# Patient Record
Sex: FEMALE | Race: ASIAN | Employment: UNEMPLOYED | URBAN - METROPOLITAN AREA
[De-identification: names, ages, dates, MRNs, and addresses within clinical notes are randomized per-mention and may not be internally consistent; named-entity substitution may affect disease eponyms.]

---

## 2023-03-23 ENCOUNTER — OFFICE VISIT (OUTPATIENT)
Dept: INTERNAL MEDICINE CLINIC | Facility: CLINIC | Age: 33
End: 2023-03-23
Payer: COMMERCIAL

## 2023-03-23 VITALS
BODY MASS INDEX: 22.75 KG/M2 | WEIGHT: 138.19 LBS | HEIGHT: 65.5 IN | OXYGEN SATURATION: 100 % | SYSTOLIC BLOOD PRESSURE: 100 MMHG | DIASTOLIC BLOOD PRESSURE: 60 MMHG | HEART RATE: 78 BPM | RESPIRATION RATE: 16 BRPM | TEMPERATURE: 98 F

## 2023-03-23 DIAGNOSIS — Z00.00 ROUTINE PHYSICAL EXAMINATION: Primary | ICD-10-CM

## 2023-03-23 PROCEDURE — 3074F SYST BP LT 130 MM HG: CPT | Performed by: INTERNAL MEDICINE

## 2023-03-23 PROCEDURE — 3008F BODY MASS INDEX DOCD: CPT | Performed by: INTERNAL MEDICINE

## 2023-03-23 PROCEDURE — 99385 PREV VISIT NEW AGE 18-39: CPT | Performed by: INTERNAL MEDICINE

## 2023-03-23 PROCEDURE — 3078F DIAST BP <80 MM HG: CPT | Performed by: INTERNAL MEDICINE

## 2023-03-23 NOTE — PATIENT INSTRUCTIONS
Continue to exercise at least 150 minutes a week and Eat a plant based diet      Please take 2000 IU of vitamin D daily    Please have blood tests done    See me yearly

## 2023-04-08 ENCOUNTER — LAB ENCOUNTER (OUTPATIENT)
Dept: LAB | Age: 33
End: 2023-04-08
Attending: INTERNAL MEDICINE
Payer: COMMERCIAL

## 2023-04-08 DIAGNOSIS — Z00.00 ROUTINE PHYSICAL EXAMINATION: ICD-10-CM

## 2023-04-08 LAB
ALT SERPL-CCNC: 19 U/L
ANION GAP SERPL CALC-SCNC: 3 MMOL/L (ref 0–18)
AST SERPL-CCNC: 16 U/L (ref 15–37)
BASOPHILS # BLD AUTO: 0.02 X10(3) UL (ref 0–0.2)
BASOPHILS NFR BLD AUTO: 0.3 %
BUN BLD-MCNC: 10 MG/DL (ref 7–18)
CALCIUM BLD-MCNC: 9.4 MG/DL (ref 8.5–10.1)
CHLORIDE SERPL-SCNC: 109 MMOL/L (ref 98–112)
CHOLEST SERPL-MCNC: 159 MG/DL (ref ?–200)
CO2 SERPL-SCNC: 25 MMOL/L (ref 21–32)
CREAT BLD-MCNC: 0.68 MG/DL
EOSINOPHIL # BLD AUTO: 0.11 X10(3) UL (ref 0–0.7)
EOSINOPHIL NFR BLD AUTO: 1.6 %
ERYTHROCYTE [DISTWIDTH] IN BLOOD BY AUTOMATED COUNT: 14.4 %
FASTING PATIENT LIPID ANSWER: YES
FASTING STATUS PATIENT QL REPORTED: YES
GFR SERPLBLD BASED ON 1.73 SQ M-ARVRAT: 116 ML/MIN/1.73M2 (ref 60–?)
GLUCOSE BLD-MCNC: 83 MG/DL (ref 70–99)
HCT VFR BLD AUTO: 36.4 %
HDLC SERPL-MCNC: 34 MG/DL (ref 40–59)
HGB BLD-MCNC: 11.1 G/DL
IMM GRANULOCYTES # BLD AUTO: 0.02 X10(3) UL (ref 0–1)
IMM GRANULOCYTES NFR BLD: 0.3 %
LDLC SERPL CALC-MCNC: 107 MG/DL (ref ?–100)
LYMPHOCYTES # BLD AUTO: 2.15 X10(3) UL (ref 1–4)
LYMPHOCYTES NFR BLD AUTO: 32.1 %
MCH RBC QN AUTO: 24 PG (ref 26–34)
MCHC RBC AUTO-ENTMCNC: 30.5 G/DL (ref 31–37)
MCV RBC AUTO: 78.6 FL
MONOCYTES # BLD AUTO: 0.55 X10(3) UL (ref 0.1–1)
MONOCYTES NFR BLD AUTO: 8.2 %
NEUTROPHILS # BLD AUTO: 3.84 X10 (3) UL (ref 1.5–7.7)
NEUTROPHILS # BLD AUTO: 3.84 X10(3) UL (ref 1.5–7.7)
NEUTROPHILS NFR BLD AUTO: 57.5 %
NONHDLC SERPL-MCNC: 125 MG/DL (ref ?–130)
OSMOLALITY SERPL CALC.SUM OF ELEC: 282 MOSM/KG (ref 275–295)
PLATELET # BLD AUTO: 351 10(3)UL (ref 150–450)
POTASSIUM SERPL-SCNC: 4.1 MMOL/L (ref 3.5–5.1)
RBC # BLD AUTO: 4.63 X10(6)UL
SODIUM SERPL-SCNC: 137 MMOL/L (ref 136–145)
T4 FREE SERPL-MCNC: 1.1 NG/DL (ref 0.8–1.7)
TRIGL SERPL-MCNC: 99 MG/DL (ref 30–149)
TSI SER-ACNC: 8.65 MIU/ML (ref 0.36–3.74)
VLDLC SERPL CALC-MCNC: 17 MG/DL (ref 0–30)
WBC # BLD AUTO: 6.7 X10(3) UL (ref 4–11)

## 2023-04-08 PROCEDURE — 80048 BASIC METABOLIC PNL TOTAL CA: CPT | Performed by: INTERNAL MEDICINE

## 2023-04-08 PROCEDURE — 84450 TRANSFERASE (AST) (SGOT): CPT | Performed by: INTERNAL MEDICINE

## 2023-04-08 PROCEDURE — 84460 ALANINE AMINO (ALT) (SGPT): CPT | Performed by: INTERNAL MEDICINE

## 2023-04-08 PROCEDURE — 80061 LIPID PANEL: CPT | Performed by: INTERNAL MEDICINE

## 2023-04-08 PROCEDURE — 84439 ASSAY OF FREE THYROXINE: CPT | Performed by: INTERNAL MEDICINE

## 2023-04-08 PROCEDURE — 84443 ASSAY THYROID STIM HORMONE: CPT | Performed by: INTERNAL MEDICINE

## 2023-04-08 PROCEDURE — 85025 COMPLETE CBC W/AUTO DIFF WBC: CPT | Performed by: INTERNAL MEDICINE

## 2023-04-10 ENCOUNTER — TELEPHONE (OUTPATIENT)
Dept: INTERNAL MEDICINE CLINIC | Facility: CLINIC | Age: 33
End: 2023-04-10

## 2023-04-10 NOTE — TELEPHONE ENCOUNTER
Please have patient schedule a video visit to discuss her test results  32 \[facility] [place] [facility]\"" DO

## 2023-04-10 NOTE — TELEPHONE ENCOUNTER
Patient scheduled.     Future Appointments   Date Time Provider Erum Ritchie   4/11/2023 10:00 AM Cheryl Gamboa DO [facility] [place]

## 2023-04-11 ENCOUNTER — TELEMEDICINE (OUTPATIENT)
Dept: INTERNAL MEDICINE CLINIC | Facility: CLINIC | Age: 33
End: 2023-04-11
Payer: COMMERCIAL

## 2023-04-11 DIAGNOSIS — D50.0 IRON DEFICIENCY ANEMIA DUE TO CHRONIC BLOOD LOSS: ICD-10-CM

## 2023-04-11 DIAGNOSIS — E55.9 VITAMIN D DEFICIENCY: ICD-10-CM

## 2023-04-11 DIAGNOSIS — E03.9 ACQUIRED HYPOTHYROIDISM: Primary | ICD-10-CM

## 2023-04-11 DIAGNOSIS — E53.8 VITAMIN B12 DEFICIENCY: ICD-10-CM

## 2023-04-11 PROCEDURE — 99213 OFFICE O/P EST LOW 20 MIN: CPT | Performed by: INTERNAL MEDICINE

## 2023-04-11 RX ORDER — FERROUS SULFATE 325(65) MG
325 TABLET ORAL
Qty: 90 TABLET | Refills: 0 | Status: SHIPPED | OUTPATIENT
Start: 2023-04-11

## 2023-04-11 RX ORDER — LEVOTHYROXINE SODIUM 0.03 MG/1
25 TABLET ORAL
Qty: 90 TABLET | Refills: 1 | Status: SHIPPED | OUTPATIENT
Start: 2023-04-11

## 2023-04-11 NOTE — PROGRESS NOTES
Virtual Telephone Check-In    This visit is conducted using Telemedicine with live, interactive video and audio. Patient resides in [place]   Patient understands and accepts financial responsibility for any deductible, co-insurance and/or co-pays associated with this service. Telehealth outside of [employer] Verbal Consent   I conducted a telehealth visit with [facility] on 4/11/2023   which was completed using two-way, real-time interactive audio and video  communication. This has been done in good moose to provide continuity of care in the best interest of the provider-patient relationship, due to the COVID -19 public health crisis/national emergency where restrictions of face-to-face office visits are ongoing. Every conscious effort was taken to allow for sufficient and adequate time to complete the visit. The patient was made aware of the limitations of the telehealth visit, including treatment limitations as no physical exam could be performed. The patient was advised to call 911 or to go to the ER in case there was an emergency. The patient was also advised of the potential privacy & security concerns related to the telehealth platform. The patient was made aware of where to find [facility] notice of privacy practices, telehealth consent form and other related consent forms and documents. which are located on the [facility] website. The patient verbally agreed to telehealth consent form, related consents and the risks discussed. Lastly, the patient confirmed that they were in [place]. Included in this visit, time may have been spent reviewing labs, medications, radiology tests and decision making. Appropriate medical decision-making and tests are ordered as detailed in the plan of care above. Coding/billing information is submitted for this visit based on complexity of care and/or time spent for the visit.   Time spent: 6 minutes  HPI: Ubaldo Flores is a 26year old female who is calling to discuss test results. Had hypothyroidism during pregnancy. Has heavy menses as well   ROS:  General: Feels well overall  Skin: Denies any unusual skin lesions  Eyes: Denies blurred vision or double vision  All systems negative, except for above  Physical:  GENERAL: Alert and oriented, well developed, well nourished,in no apparent distress  SKIN: no rashes,no suspicious lesions on face  LUNGS: no audible wheezing  PSYCH: pleasant, appropriate mood and affect    Assessment and Plan  (E03.9) Acquired hypothyroidism  (primary encounter diagnosis)  Plan: levothyroxine 25 MCG Oral Tab, ASSAY, THYROID         STIM HORMONE  Note: will start medication as above. Discussed how to take the medication     (D50.0) Iron deficiency anemia due to chronic blood loss  Plan: Ferrous Sulfate 325 (65 Fe) MG Oral Tab, CBC         WITH DIFFERENTIAL WITH PLATELET  Note: will start iron supplement as above.  Discussed the side effects     (E55.9) Vitamin D deficiency  Plan: VITAMIN D  Note: requesting labs    (E53.8) Vitamin B12 deficiency  Plan: VITAMIN B12  Note: requesting labs    32 [facility]

## 2023-08-03 ENCOUNTER — LAB ENCOUNTER (OUTPATIENT)
Dept: LAB | Age: 33
End: 2023-08-03
Attending: INTERNAL MEDICINE
Payer: COMMERCIAL

## 2023-08-03 ENCOUNTER — OFFICE VISIT (OUTPATIENT)
Dept: INTERNAL MEDICINE CLINIC | Facility: CLINIC | Age: 33
End: 2023-08-03
Payer: COMMERCIAL

## 2023-08-03 VITALS
HEART RATE: 76 BPM | WEIGHT: 146.19 LBS | TEMPERATURE: 98 F | BODY MASS INDEX: 24.36 KG/M2 | OXYGEN SATURATION: 99 % | SYSTOLIC BLOOD PRESSURE: 112 MMHG | HEIGHT: 65 IN | DIASTOLIC BLOOD PRESSURE: 68 MMHG | RESPIRATION RATE: 14 BRPM

## 2023-08-03 DIAGNOSIS — E55.9 VITAMIN D DEFICIENCY: ICD-10-CM

## 2023-08-03 DIAGNOSIS — E03.9 ACQUIRED HYPOTHYROIDISM: ICD-10-CM

## 2023-08-03 DIAGNOSIS — D50.0 IRON DEFICIENCY ANEMIA DUE TO CHRONIC BLOOD LOSS: ICD-10-CM

## 2023-08-03 DIAGNOSIS — M79.7 FIBROMYALGIA: ICD-10-CM

## 2023-08-03 DIAGNOSIS — E53.8 VITAMIN B12 DEFICIENCY: ICD-10-CM

## 2023-08-03 DIAGNOSIS — E03.9 ACQUIRED HYPOTHYROIDISM: Primary | ICD-10-CM

## 2023-08-03 LAB
BASOPHILS # BLD AUTO: 0.03 X10(3) UL (ref 0–0.2)
BASOPHILS NFR BLD AUTO: 0.5 %
CRP SERPL-MCNC: <0.29 MG/DL (ref ?–0.3)
DEPRECATED HBV CORE AB SER IA-ACNC: 20.6 NG/ML
EOSINOPHIL # BLD AUTO: 0.1 X10(3) UL (ref 0–0.7)
EOSINOPHIL NFR BLD AUTO: 1.6 %
ERYTHROCYTE [DISTWIDTH] IN BLOOD BY AUTOMATED COUNT: 13.5 %
ERYTHROCYTE [SEDIMENTATION RATE] IN BLOOD: 39 MM/HR
HCT VFR BLD AUTO: 39.5 %
HGB BLD-MCNC: 12.6 G/DL
IMM GRANULOCYTES # BLD AUTO: 0.01 X10(3) UL (ref 0–1)
IMM GRANULOCYTES NFR BLD: 0.2 %
LYMPHOCYTES # BLD AUTO: 2.05 X10(3) UL (ref 1–4)
LYMPHOCYTES NFR BLD AUTO: 32.1 %
MCH RBC QN AUTO: 26.5 PG (ref 26–34)
MCHC RBC AUTO-ENTMCNC: 31.9 G/DL (ref 31–37)
MCV RBC AUTO: 83 FL
MONOCYTES # BLD AUTO: 0.46 X10(3) UL (ref 0.1–1)
MONOCYTES NFR BLD AUTO: 7.2 %
NEUTROPHILS # BLD AUTO: 3.74 X10 (3) UL (ref 1.5–7.7)
NEUTROPHILS # BLD AUTO: 3.74 X10(3) UL (ref 1.5–7.7)
NEUTROPHILS NFR BLD AUTO: 58.4 %
PLATELET # BLD AUTO: 304 10(3)UL (ref 150–450)
RBC # BLD AUTO: 4.76 X10(6)UL
TSI SER-ACNC: 5.35 MIU/ML (ref 0.36–3.74)
VIT B12 SERPL-MCNC: 178 PG/ML (ref 193–986)
VIT D+METAB SERPL-MCNC: 6.7 NG/ML (ref 30–100)
WBC # BLD AUTO: 6.4 X10(3) UL (ref 4–11)

## 2023-08-03 PROCEDURE — 84443 ASSAY THYROID STIM HORMONE: CPT | Performed by: INTERNAL MEDICINE

## 2023-08-03 PROCEDURE — 86140 C-REACTIVE PROTEIN: CPT | Performed by: INTERNAL MEDICINE

## 2023-08-03 PROCEDURE — 85652 RBC SED RATE AUTOMATED: CPT | Performed by: INTERNAL MEDICINE

## 2023-08-03 PROCEDURE — 3078F DIAST BP <80 MM HG: CPT | Performed by: INTERNAL MEDICINE

## 2023-08-03 PROCEDURE — 82306 VITAMIN D 25 HYDROXY: CPT | Performed by: INTERNAL MEDICINE

## 2023-08-03 PROCEDURE — 82607 VITAMIN B-12: CPT | Performed by: INTERNAL MEDICINE

## 2023-08-03 PROCEDURE — 99213 OFFICE O/P EST LOW 20 MIN: CPT | Performed by: INTERNAL MEDICINE

## 2023-08-03 PROCEDURE — 3074F SYST BP LT 130 MM HG: CPT | Performed by: INTERNAL MEDICINE

## 2023-08-03 PROCEDURE — 82728 ASSAY OF FERRITIN: CPT | Performed by: INTERNAL MEDICINE

## 2023-08-03 PROCEDURE — 3008F BODY MASS INDEX DOCD: CPT | Performed by: INTERNAL MEDICINE

## 2023-08-03 PROCEDURE — 85025 COMPLETE CBC W/AUTO DIFF WBC: CPT | Performed by: INTERNAL MEDICINE

## 2023-08-03 RX ORDER — LEVOTHYROXINE SODIUM 0.03 MG/1
25 TABLET ORAL
Qty: 90 TABLET | Refills: 1 | Status: SHIPPED | OUTPATIENT
Start: 2023-08-03

## 2023-08-03 RX ORDER — FLUOXETINE 10 MG/1
10 TABLET, FILM COATED ORAL DAILY
Qty: 90 TABLET | Refills: 0 | Status: SHIPPED | OUTPATIENT
Start: 2023-08-03

## 2023-08-03 NOTE — PATIENT INSTRUCTIONS
Please have blood test done. Continue the iron supplement and the thyroid medication. Start Prozac in the morning and let me know in 6 weeks how you are doing.   We will call you with the test results

## 2023-08-04 DIAGNOSIS — E53.8 VITAMIN B12 DEFICIENCY: ICD-10-CM

## 2023-08-04 DIAGNOSIS — E55.9 VITAMIN D DEFICIENCY: Primary | ICD-10-CM

## 2023-08-04 RX ORDER — ERGOCALCIFEROL 1.25 MG/1
50000 CAPSULE ORAL WEEKLY
Qty: 12 CAPSULE | Refills: 0 | Status: SHIPPED | OUTPATIENT
Start: 2023-08-04 | End: 2023-10-27

## 2023-08-04 NOTE — PROGRESS NOTES
Dear RN, please let the patient know   1. Vitamin B12 is low. Needs to take 1000 mcg of vitamin B12 daily and recheck labs in 3 months  2. Thyroid is improving. Continue current dose  3. Vitamin D is very low. I will send a prescription strength medication for 12 weeks. Then Please take 2000 IU of vitamin D daily for life. Recheck labs in 3 months  4 there is mild inflammation noted and sometimes can be if she is anemic. If her pain does not improve with prozac then I will do further testing  Iron levels have improved.  Continue iron supplement  Daisy Barakat DO

## 2024-04-04 ENCOUNTER — LAB ENCOUNTER (OUTPATIENT)
Dept: LAB | Age: 34
End: 2024-04-04
Attending: INTERNAL MEDICINE
Payer: COMMERCIAL

## 2024-04-04 ENCOUNTER — OFFICE VISIT (OUTPATIENT)
Dept: INTERNAL MEDICINE CLINIC | Facility: CLINIC | Age: 34
End: 2024-04-04
Payer: COMMERCIAL

## 2024-04-04 VITALS
RESPIRATION RATE: 14 BRPM | BODY MASS INDEX: 25.05 KG/M2 | HEIGHT: 65 IN | DIASTOLIC BLOOD PRESSURE: 68 MMHG | OXYGEN SATURATION: 98 % | HEART RATE: 70 BPM | SYSTOLIC BLOOD PRESSURE: 110 MMHG | TEMPERATURE: 98 F | WEIGHT: 150.38 LBS

## 2024-04-04 DIAGNOSIS — E55.9 VITAMIN D DEFICIENCY: ICD-10-CM

## 2024-04-04 DIAGNOSIS — F41.9 ANXIETY: ICD-10-CM

## 2024-04-04 DIAGNOSIS — D50.0 IRON DEFICIENCY ANEMIA DUE TO CHRONIC BLOOD LOSS: ICD-10-CM

## 2024-04-04 DIAGNOSIS — Z00.00 ROUTINE PHYSICAL EXAMINATION: ICD-10-CM

## 2024-04-04 DIAGNOSIS — E53.8 VITAMIN B12 DEFICIENCY: ICD-10-CM

## 2024-04-04 DIAGNOSIS — L30.9 DERMATITIS: ICD-10-CM

## 2024-04-04 DIAGNOSIS — Z00.00 ROUTINE PHYSICAL EXAMINATION: Primary | ICD-10-CM

## 2024-04-04 DIAGNOSIS — E03.9 ACQUIRED HYPOTHYROIDISM: ICD-10-CM

## 2024-04-04 LAB
ALT SERPL-CCNC: 16 U/L
ANION GAP SERPL CALC-SCNC: 6 MMOL/L (ref 0–18)
AST SERPL-CCNC: 23 U/L (ref ?–34)
BASOPHILS # BLD AUTO: 0.02 X10(3) UL (ref 0–0.2)
BASOPHILS NFR BLD AUTO: 0.3 %
BUN BLD-MCNC: 8 MG/DL (ref 9–23)
BUN/CREAT SERPL: 10.5 (ref 10–20)
CALCIUM BLD-MCNC: 9.2 MG/DL (ref 8.7–10.4)
CHLORIDE SERPL-SCNC: 110 MMOL/L (ref 98–112)
CHOLEST SERPL-MCNC: 167 MG/DL (ref ?–200)
CO2 SERPL-SCNC: 24 MMOL/L (ref 21–32)
CREAT BLD-MCNC: 0.76 MG/DL
DEPRECATED HBV CORE AB SER IA-ACNC: 13.6 NG/ML
DEPRECATED RDW RBC AUTO: 38.6 FL (ref 35.1–46.3)
EGFRCR SERPLBLD CKD-EPI 2021: 105 ML/MIN/1.73M2 (ref 60–?)
EOSINOPHIL # BLD AUTO: 0.08 X10(3) UL (ref 0–0.7)
EOSINOPHIL NFR BLD AUTO: 1.2 %
ERYTHROCYTE [DISTWIDTH] IN BLOOD BY AUTOMATED COUNT: 12.9 % (ref 11–15)
EST. AVERAGE GLUCOSE BLD GHB EST-MCNC: 111 MG/DL (ref 68–126)
FASTING PATIENT LIPID ANSWER: YES
FASTING STATUS PATIENT QL REPORTED: YES
GLUCOSE BLD-MCNC: 94 MG/DL (ref 70–99)
HBA1C MFR BLD: 5.5 % (ref ?–5.7)
HCT VFR BLD AUTO: 37.8 %
HDLC SERPL-MCNC: 31 MG/DL (ref 40–59)
HGB BLD-MCNC: 11.9 G/DL
IMM GRANULOCYTES # BLD AUTO: 0.02 X10(3) UL (ref 0–1)
IMM GRANULOCYTES NFR BLD: 0.3 %
LDLC SERPL CALC-MCNC: 102 MG/DL (ref ?–100)
LYMPHOCYTES # BLD AUTO: 1.89 X10(3) UL (ref 1–4)
LYMPHOCYTES NFR BLD AUTO: 27.8 %
MCH RBC QN AUTO: 25.9 PG (ref 26–34)
MCHC RBC AUTO-ENTMCNC: 31.5 G/DL (ref 31–37)
MCV RBC AUTO: 82.4 FL
MONOCYTES # BLD AUTO: 0.5 X10(3) UL (ref 0.1–1)
MONOCYTES NFR BLD AUTO: 7.3 %
NEUTROPHILS # BLD AUTO: 4.3 X10 (3) UL (ref 1.5–7.7)
NEUTROPHILS # BLD AUTO: 4.3 X10(3) UL (ref 1.5–7.7)
NEUTROPHILS NFR BLD AUTO: 63.1 %
NONHDLC SERPL-MCNC: 136 MG/DL (ref ?–130)
OSMOLALITY SERPL CALC.SUM OF ELEC: 288 MOSM/KG (ref 275–295)
PLATELET # BLD AUTO: 350 10(3)UL (ref 150–450)
POTASSIUM SERPL-SCNC: 4.4 MMOL/L (ref 3.5–5.1)
RBC # BLD AUTO: 4.59 X10(6)UL
SODIUM SERPL-SCNC: 140 MMOL/L (ref 136–145)
T4 FREE SERPL-MCNC: 1.2 NG/DL (ref 0.8–1.7)
TRIGL SERPL-MCNC: 194 MG/DL (ref 30–149)
TSI SER-ACNC: 6.23 MIU/ML (ref 0.55–4.78)
VIT B12 SERPL-MCNC: 297 PG/ML (ref 211–911)
VIT D+METAB SERPL-MCNC: 16.5 NG/ML (ref 30–100)
VLDLC SERPL CALC-MCNC: 33 MG/DL (ref 0–30)
WBC # BLD AUTO: 6.8 X10(3) UL (ref 4–11)

## 2024-04-04 PROCEDURE — 84439 ASSAY OF FREE THYROXINE: CPT

## 2024-04-04 PROCEDURE — 99213 OFFICE O/P EST LOW 20 MIN: CPT | Performed by: INTERNAL MEDICINE

## 2024-04-04 PROCEDURE — 80048 BASIC METABOLIC PNL TOTAL CA: CPT

## 2024-04-04 PROCEDURE — 36415 COLL VENOUS BLD VENIPUNCTURE: CPT

## 2024-04-04 PROCEDURE — 3078F DIAST BP <80 MM HG: CPT | Performed by: INTERNAL MEDICINE

## 2024-04-04 PROCEDURE — 80061 LIPID PANEL: CPT

## 2024-04-04 PROCEDURE — 99395 PREV VISIT EST AGE 18-39: CPT | Performed by: INTERNAL MEDICINE

## 2024-04-04 PROCEDURE — 84460 ALANINE AMINO (ALT) (SGPT): CPT

## 2024-04-04 PROCEDURE — 82607 VITAMIN B-12: CPT

## 2024-04-04 PROCEDURE — 3008F BODY MASS INDEX DOCD: CPT | Performed by: INTERNAL MEDICINE

## 2024-04-04 PROCEDURE — 82306 VITAMIN D 25 HYDROXY: CPT

## 2024-04-04 PROCEDURE — 84443 ASSAY THYROID STIM HORMONE: CPT

## 2024-04-04 PROCEDURE — 84450 TRANSFERASE (AST) (SGOT): CPT

## 2024-04-04 PROCEDURE — 82728 ASSAY OF FERRITIN: CPT

## 2024-04-04 PROCEDURE — 85025 COMPLETE CBC W/AUTO DIFF WBC: CPT

## 2024-04-04 PROCEDURE — 83036 HEMOGLOBIN GLYCOSYLATED A1C: CPT

## 2024-04-04 PROCEDURE — 3074F SYST BP LT 130 MM HG: CPT | Performed by: INTERNAL MEDICINE

## 2024-04-04 RX ORDER — DULOXETIN HYDROCHLORIDE 30 MG/1
30 CAPSULE, DELAYED RELEASE ORAL NIGHTLY
Qty: 90 CAPSULE | Refills: 0 | Status: SHIPPED | OUTPATIENT
Start: 2024-04-04

## 2024-04-04 NOTE — PATIENT INSTRUCTIONS
Continue to exercise at least 150 minutes a week and Eat a plant based diet     Please take 2000 IU of vitamin D daily for life to keep your bones strong    Please see your dentist every 6 months  Continue with regular eye exams    Please have blood work done    Start cymbalta at night time. It may take up to 4 weeks to see an affect. Send me a message in 6 weeks    Start the cream I have provided and take zyrtec

## 2024-04-04 NOTE — PROGRESS NOTES
Patient Office Visit    ASSESSMENT AND PLAN:   1. Routine physical examination  Note: Continue to exercise at least 150 minutes a week and Eat a plant based diet. Please see your dentist every 6 months and continue with regular eye exams  - ALT(SGPT); Future  - AST (SGOT); Future  - Basic Metabolic Panel (8); Future  - Lipid Panel; Future  - Hemoglobin A1C; Future  - CBC With Differential With Platelet; Future  - TSH W Reflex To Free T4; Future  - Vitamin D; Future  - Vitamin B12 [E]; Future    2. Iron deficiency anemia due to chronic blood loss  Note: will repeat labs   - CBC With Differential With Platelet; Future  - Ferritin [E]; Future    3. Acquired hypothyroidism  Note: has stopped the medication. Will repeat labs   - TSH W Reflex To Free T4; Future    4. Vitamin B12 deficiency  - Vitamin B12 [E]; Future    5. Vitamin D deficiency  - Vitamin D; Future    6. Anxiety  Note: did not see any benefit from prozac. Will start cymbalta. Discussed the side effects. May take up to 4 weeks to see an affect   - DULoxetine (CYMBALTA) 30 MG Oral Cap DR Particles; Take 1 capsule (30 mg total) by mouth at bedtime.  Dispense: 90 capsule; Refill: 0    7. Dermatitis  Note: recommended to stop the oil and start treatment below for 2 weeks. Zyrtec samples provided as well   - hydrocortisone 2.5 % External Cream; Apply twice a day for 2 weeks to affected area then stop  Dispense: 20 g; Refill: 0    RTC in 4-6 weeks   Declines pap today      Patient/Caregiver Education: Patient/Caregiver Education: There are no barriers to learning. Medical education done. Outcome: Patient verbalizes understanding. Patient is notified to call with any questions, complications, allergies, or worsening or changing symptoms.  Patient is to call with any side effects or complications from the treatments as a result of today.      Reviewed Past Medical History and   Patient Active Problem List   Diagnosis    Acquired hypothyroidism    Iron deficiency  anemia due to chronic blood loss    Vitamin B12 deficiency    Vitamin D deficiency       Orders Placed This Encounter   Procedures    ALT(SGPT)     Standing Status:   Future     Standing Expiration Date:   4/4/2025    AST (SGOT)     Standing Status:   Future     Standing Expiration Date:   4/4/2025    Basic Metabolic Panel (8)     Standing Status:   Future     Standing Expiration Date:   4/4/2025    Lipid Panel     Standing Status:   Future     Standing Expiration Date:   4/4/2025    Hemoglobin A1C     Standing Status:   Future     Standing Expiration Date:   4/4/2025    CBC With Differential With Platelet     Standing Status:   Future     Standing Expiration Date:   4/4/2025    TSH W Reflex To Free T4     Standing Status:   Future     Standing Expiration Date:   4/4/2025    Vitamin D     Standing Status:   Future     Standing Expiration Date:   4/4/2025     Order Specific Question:   Please pick the scenario that best fits the purpose for ordering this test     Answer:   General Screening/Vit D deficiency (25-Hydroxy)     Order Specific Question:   Release to patient     Answer:   Immediate    Vitamin B12 [E]     Standing Status:   Future     Standing Expiration Date:   4/4/2025     Order Specific Question:   Release to patient     Answer:   Immediate    Ferritin [E]     Standing Status:   Future     Standing Expiration Date:   4/4/2025     Order Specific Question:   Release to patient     Answer:   Immediate     Requested Prescriptions     Signed Prescriptions Disp Refills    DULoxetine (CYMBALTA) 30 MG Oral Cap DR Particles 90 capsule 0     Sig: Take 1 capsule (30 mg total) by mouth at bedtime.    hydrocortisone 2.5 % External Cream 20 g 0     Sig: Apply twice a day for 2 weeks to affected area then stop         Daisy Miller DO  CC:  Chief Complaint   Patient presents with    Medication Follow-Up    Derm Problem     Rash on forehead. 3-4 weeks.          HPI:   Sarai Young is a 33 year old female  who presents for a routine physical and to discuss the following concerns    Hypothyroid/iron deficiency: off medications due to insurance problems so could not get a test  Anxiety: still has a feeling of severe nervousness if she has a family situation. Feels like her whole body is in pain even though she has no particular joint pain. She tried prozac, but it did not help. She would like to try something different  Dermatitis: she tried a new oil on her face (jojoba/rosehip oil) and her entire face broke out. It is itchy and requesting a cream to resolve it     No past medical history on file.    Past Surgical History:   Procedure Laterality Date             Social History:  Social History     Socioeconomic History    Marital status:    Tobacco Use    Smoking status: Never    Smokeless tobacco: Never    Tobacco comments:     No   Vaping Use    Vaping Use: Never used   Substance and Sexual Activity    Alcohol use: Never    Drug use: Never   Other Topics Concern    Caffeine Concern No    Exercise No    Seat Belt Yes    Special Diet No    Stress Concern No    Weight Concern No     Family History:  No family history on file.  Allergies:  No Known Allergies  Current Meds:  Current Outpatient Medications on File Prior to Visit   Medication Sig Dispense Refill    levothyroxine 25 MCG Oral Tab Take 1 tablet (25 mcg total) by mouth before breakfast. (Patient not taking: Reported on 2024) 90 tablet 1    Ferrous Sulfate 325 (65 Fe) MG Oral Tab Take 1 tablet (325 mg total) by mouth daily with breakfast. (Patient not taking: Reported on 2024) 90 tablet 0     No current facility-administered medications on file prior to visit.         REVIEW OF SYSTEMS   Constitutional: no fatigue normal energy no weight changes   HENT: normal sinuses and no mouth issues   Eyes: . normal vision no eye pain   Respiratory: normal respirations no cough   Cardiovascular: no CP, or palpitations   Gastrointestinal: normal  bowels and no abd pains   Genitourinary:  normal urination no hematuria, no frequency   Musculoskeletal: no pains in arms/legs, normal range of motion   Skin: no rashes or skin lesions that are new   Neurological:  no weakness, no numbness, normal gait   Hematological:  no bruises or bleeding   Psychiatric/Behavioral: normal mood no anxiety normal behavior     /68 (BP Location: Left arm, Patient Position: Sitting, Cuff Size: adult)   Pulse 70   Temp 97.8 °F (36.6 °C) (Temporal)   Resp 14   Ht 5' 5\" (1.651 m)   Wt 150 lb 6.4 oz (68.2 kg)   LMP 03/09/2024 (Exact Date)   SpO2 98%   BMI 25.03 kg/m²     PHYSICAL EXAM:   Constitutional: Vital signs reviewed as noted, well developed, in no acute distress.   HENT: NCAT, bilateral ear canal and tympanic membrane appear normal  Eyes: pupils reactive bilaterally  Neck: No thyroidmegaly  Cardiovascular: nl s1 s2 no m/r/g  Pulmonary/Chest: CTA bilaterally with no wheezes  Abdominal: Soft NT normal Bowel sounds  Extremities: no pedal edema   Neurological:  no weakness in UE and LE, reflexes are normal  Skin: small papules noted on the forehead   Psychiatric:normal mood

## 2024-04-05 DIAGNOSIS — E03.9 ACQUIRED HYPOTHYROIDISM: ICD-10-CM

## 2024-04-05 DIAGNOSIS — E55.9 VITAMIN D DEFICIENCY: Primary | ICD-10-CM

## 2024-04-05 DIAGNOSIS — D50.0 IRON DEFICIENCY ANEMIA DUE TO CHRONIC BLOOD LOSS: ICD-10-CM

## 2024-04-05 DIAGNOSIS — E53.8 VITAMIN B12 DEFICIENCY: ICD-10-CM

## 2024-04-05 RX ORDER — ERGOCALCIFEROL 1.25 MG/1
50000 CAPSULE ORAL WEEKLY
Qty: 12 CAPSULE | Refills: 0 | Status: SHIPPED | OUTPATIENT
Start: 2024-04-05 | End: 2024-06-28

## 2024-04-05 RX ORDER — MELATONIN
1000 DAILY
Qty: 90 TABLET | Refills: 0 | Status: SHIPPED | OUTPATIENT
Start: 2024-04-05

## 2024-04-05 RX ORDER — LEVOTHYROXINE SODIUM 0.03 MG/1
25 TABLET ORAL
Qty: 90 TABLET | Refills: 1 | Status: SHIPPED | OUTPATIENT
Start: 2024-04-05

## 2024-04-05 RX ORDER — FERROUS SULFATE 325(65) MG
325 TABLET ORAL
Qty: 90 TABLET | Refills: 1 | Status: SHIPPED | OUTPATIENT
Start: 2024-04-05

## 2024-05-01 ENCOUNTER — PATIENT MESSAGE (OUTPATIENT)
Dept: INTERNAL MEDICINE CLINIC | Facility: CLINIC | Age: 34
End: 2024-05-01

## 2024-05-02 NOTE — TELEPHONE ENCOUNTER
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 They asked me to have a signature in the form from the physician. Kindly please see the attached document and return to me the signed copy. Thankyou so much.

## 2024-07-15 DIAGNOSIS — F41.9 ANXIETY: ICD-10-CM

## 2024-07-15 RX ORDER — DULOXETIN HYDROCHLORIDE 30 MG/1
30 CAPSULE, DELAYED RELEASE ORAL NIGHTLY
Qty: 90 CAPSULE | Refills: 0 | Status: SHIPPED | OUTPATIENT
Start: 2024-07-15

## 2024-07-15 NOTE — TELEPHONE ENCOUNTER
PASS    LOV 4/4/24    6. Anxiety  Note: did not see any benefit from prozac. Will start cymbalta. Discussed the side effects. May take up to 4 weeks to see an affect   - DULoxetine (CYMBALTA) 30 MG Oral Cap DR Particles; Take 1 capsule (30 mg total) by mouth at bedtime.  Dispense: 90 capsule; Refill: 0    F/U 4-6 WEEKS     No future appointments.

## 2024-11-08 ENCOUNTER — TELEPHONE (OUTPATIENT)
Dept: INTERNAL MEDICINE CLINIC | Facility: CLINIC | Age: 34
End: 2024-11-08

## 2024-11-08 ENCOUNTER — PATIENT MESSAGE (OUTPATIENT)
Dept: INTERNAL MEDICINE CLINIC | Facility: CLINIC | Age: 34
End: 2024-11-08

## 2024-11-08 NOTE — TELEPHONE ENCOUNTER
Patient spouse called the office to request an appointment for a checkup as well as to have thyroid levels checked before they leave for [facility] on 11/20 to return on 01/17/25. Please call back and advise.

## 2024-11-19 ENCOUNTER — OFFICE VISIT (OUTPATIENT)
Dept: INTERNAL MEDICINE CLINIC | Facility: CLINIC | Age: 34
End: 2024-11-19
Payer: COMMERCIAL

## 2024-11-19 ENCOUNTER — LAB ENCOUNTER (OUTPATIENT)
Dept: LAB | Age: 34
End: 2024-11-19
Attending: INTERNAL MEDICINE
Payer: COMMERCIAL

## 2024-11-19 VITALS
TEMPERATURE: 98 F | DIASTOLIC BLOOD PRESSURE: 68 MMHG | WEIGHT: 148 LBS | SYSTOLIC BLOOD PRESSURE: 102 MMHG | OXYGEN SATURATION: 98 % | RESPIRATION RATE: 16 BRPM | HEIGHT: 65 IN | BODY MASS INDEX: 24.66 KG/M2 | HEART RATE: 81 BPM

## 2024-11-19 DIAGNOSIS — E53.8 VITAMIN B12 DEFICIENCY: ICD-10-CM

## 2024-11-19 DIAGNOSIS — D50.0 IRON DEFICIENCY ANEMIA DUE TO CHRONIC BLOOD LOSS: Primary | ICD-10-CM

## 2024-11-19 DIAGNOSIS — D50.0 IRON DEFICIENCY ANEMIA DUE TO CHRONIC BLOOD LOSS: ICD-10-CM

## 2024-11-19 DIAGNOSIS — E78.2 MIXED HYPERLIPIDEMIA: ICD-10-CM

## 2024-11-19 DIAGNOSIS — E55.9 VITAMIN D DEFICIENCY: ICD-10-CM

## 2024-11-19 DIAGNOSIS — E03.9 ACQUIRED HYPOTHYROIDISM: ICD-10-CM

## 2024-11-19 LAB
ANION GAP SERPL CALC-SCNC: 5 MMOL/L (ref 0–18)
BASOPHILS # BLD AUTO: 0.02 X10(3) UL (ref 0–0.2)
BASOPHILS NFR BLD AUTO: 0.4 %
BUN BLD-MCNC: 8 MG/DL (ref 9–23)
CALCIUM BLD-MCNC: 9.5 MG/DL (ref 8.7–10.4)
CHLORIDE SERPL-SCNC: 108 MMOL/L (ref 98–112)
CHOLEST SERPL-MCNC: 184 MG/DL (ref ?–200)
CO2 SERPL-SCNC: 27 MMOL/L (ref 21–32)
CREAT BLD-MCNC: 0.78 MG/DL
DEPRECATED HBV CORE AB SER IA-ACNC: 11 NG/ML
EGFRCR SERPLBLD CKD-EPI 2021: 101 ML/MIN/1.73M2 (ref 60–?)
EOSINOPHIL # BLD AUTO: 0.07 X10(3) UL (ref 0–0.7)
EOSINOPHIL NFR BLD AUTO: 1.5 %
ERYTHROCYTE [DISTWIDTH] IN BLOOD BY AUTOMATED COUNT: 13.2 %
FASTING PATIENT LIPID ANSWER: YES
FASTING STATUS PATIENT QL REPORTED: YES
GLUCOSE BLD-MCNC: 90 MG/DL (ref 70–99)
HCT VFR BLD AUTO: 37.2 %
HDLC SERPL-MCNC: 39 MG/DL (ref 40–59)
HGB BLD-MCNC: 11.6 G/DL
IMM GRANULOCYTES # BLD AUTO: 0.02 X10(3) UL (ref 0–1)
IMM GRANULOCYTES NFR BLD: 0.4 %
LDLC SERPL CALC-MCNC: 128 MG/DL (ref ?–100)
LYMPHOCYTES # BLD AUTO: 1.69 X10(3) UL (ref 1–4)
LYMPHOCYTES NFR BLD AUTO: 35.9 %
MCH RBC QN AUTO: 25.4 PG (ref 26–34)
MCHC RBC AUTO-ENTMCNC: 31.2 G/DL (ref 31–37)
MCV RBC AUTO: 81.6 FL
MONOCYTES # BLD AUTO: 0.43 X10(3) UL (ref 0.1–1)
MONOCYTES NFR BLD AUTO: 9.1 %
NEUTROPHILS # BLD AUTO: 2.48 X10 (3) UL (ref 1.5–7.7)
NEUTROPHILS # BLD AUTO: 2.48 X10(3) UL (ref 1.5–7.7)
NEUTROPHILS NFR BLD AUTO: 52.7 %
NONHDLC SERPL-MCNC: 145 MG/DL (ref ?–130)
OSMOLALITY SERPL CALC.SUM OF ELEC: 288 MOSM/KG (ref 275–295)
PLATELET # BLD AUTO: 324 10(3)UL (ref 150–450)
POTASSIUM SERPL-SCNC: 4.2 MMOL/L (ref 3.5–5.1)
RBC # BLD AUTO: 4.56 X10(6)UL
SODIUM SERPL-SCNC: 140 MMOL/L (ref 136–145)
T4 FREE SERPL-MCNC: 1.3 NG/DL (ref 0.8–1.7)
TRIGL SERPL-MCNC: 94 MG/DL (ref 30–149)
TSI SER-ACNC: 9.84 UIU/ML (ref 0.55–4.78)
VIT B12 SERPL-MCNC: 334 PG/ML (ref 211–911)
VIT D+METAB SERPL-MCNC: 39 NG/ML (ref 30–100)
VIT D+METAB SERPL-MCNC: 39.4 NG/ML (ref 30–100)
VLDLC SERPL CALC-MCNC: 17 MG/DL (ref 0–30)
WBC # BLD AUTO: 4.7 X10(3) UL (ref 4–11)

## 2024-11-19 PROCEDURE — 84443 ASSAY THYROID STIM HORMONE: CPT | Performed by: INTERNAL MEDICINE

## 2024-11-19 PROCEDURE — 99213 OFFICE O/P EST LOW 20 MIN: CPT | Performed by: INTERNAL MEDICINE

## 2024-11-19 PROCEDURE — 80048 BASIC METABOLIC PNL TOTAL CA: CPT | Performed by: INTERNAL MEDICINE

## 2024-11-19 PROCEDURE — 3074F SYST BP LT 130 MM HG: CPT | Performed by: INTERNAL MEDICINE

## 2024-11-19 PROCEDURE — 80061 LIPID PANEL: CPT | Performed by: INTERNAL MEDICINE

## 2024-11-19 PROCEDURE — 82306 VITAMIN D 25 HYDROXY: CPT | Performed by: INTERNAL MEDICINE

## 2024-11-19 PROCEDURE — 3008F BODY MASS INDEX DOCD: CPT | Performed by: INTERNAL MEDICINE

## 2024-11-19 PROCEDURE — 85025 COMPLETE CBC W/AUTO DIFF WBC: CPT | Performed by: INTERNAL MEDICINE

## 2024-11-19 PROCEDURE — 82607 VITAMIN B-12: CPT | Performed by: INTERNAL MEDICINE

## 2024-11-19 PROCEDURE — 84439 ASSAY OF FREE THYROXINE: CPT | Performed by: INTERNAL MEDICINE

## 2024-11-19 PROCEDURE — 82728 ASSAY OF FERRITIN: CPT | Performed by: INTERNAL MEDICINE

## 2024-11-19 PROCEDURE — 3078F DIAST BP <80 MM HG: CPT | Performed by: INTERNAL MEDICINE

## 2024-11-19 PROCEDURE — G2211 COMPLEX E/M VISIT ADD ON: HCPCS | Performed by: INTERNAL MEDICINE

## 2024-11-19 NOTE — PATIENT INSTRUCTIONS
Safe travels and I have ordered the blood test.  I will send you a message after the results are in.    I highly recommend the flu vaccine

## 2024-11-19 NOTE — PROGRESS NOTES
Patient Office Visit    ASSESSMENT AND PLAN:   1. Iron deficiency anemia due to chronic blood loss  Note: Has menses regularly and stop the iron supplement  - TSH W Reflex To Free T4 [E]; Future  - CBC W Differential W Platelet [E]; Future  - Ferritin [E]; Future    2. Acquired hypothyroidism  Note: No longer on levothyroxine will repeat labs  - TSH W Reflex To Free T4 [E]; Future  - Basic Metabolic Panel (8) [E]; Future    3. Vitamin B12 deficiency  - Vitamin B12 [E]; Future    4. Vitamin D deficiency  - Vitamin D; Future    5. Mixed hyperlipidemia  Note: Diet controlled  - Lipid Panel; Future    Return to clinic in 6 months for a physical  Declined the flu vaccine today  Declined the Pap smear today    Patient/Caregiver Education: Patient/Caregiver Education: There are no barriers to learning. Medical education done. Outcome: Patient verbalizes understanding. Patient is notified to call with any questions, complications, allergies, or worsening or changing symptoms.  Patient is to call with any side effects or complications from the treatments as a result of today.      Reviewed Past Medical History and   Patient Active Problem List   Diagnosis    Acquired hypothyroidism    Iron deficiency anemia due to chronic blood loss    Vitamin B12 deficiency    Vitamin D deficiency       Orders Placed This Encounter   Procedures    Vitamin B12 [E]     Standing Status:   Future     Standing Expiration Date:   11/19/2025     Order Specific Question:   Release to patient     Answer:   Immediate    Vitamin D     Standing Status:   Future     Standing Expiration Date:   11/19/2025     Order Specific Question:   Please pick the scenario that best fits the purpose for ordering this test     Answer:   General Screening/Vit D deficiency (25-Hydroxy)     Order Specific Question:   Release to patient     Answer:   Immediate    TSH W Reflex To Free T4 [E]     Standing Status:   Future     Standing Expiration Date:   11/19/2025     Order  Specific Question:   Release to patient     Answer:   Immediate    CBC W Differential W Platelet [E]     Standing Status:   Future     Standing Expiration Date:   2025     Order Specific Question:   Release to patient     Answer:   Immediate    Ferritin [E]     Standing Status:   Future     Standing Expiration Date:   2025     Order Specific Question:   Release to patient     Answer:   Immediate    Basic Metabolic Panel (8) [E]     Standing Status:   Future     Standing Expiration Date:   2025     Order Specific Question:   Release to patient     Answer:   Immediate    Lipid Panel     Standing Status:   Future     Standing Expiration Date:   2025     Requested Prescriptions      No prescriptions requested or ordered in this encounter         Daisy Miller DO  CC:  Chief Complaint   Patient presents with    Follow - Up         HPI:   Sarai Young is a 34-year-old female who presents for blood test.  She will be traveling to [facility] tomorrow.  She is not taking any medications right now.  In regards to her pain it has improved from before.    History reviewed. No pertinent past medical history.    Past Surgical History:   Procedure Laterality Date             Social History:  Social History     Socioeconomic History    Marital status:    Tobacco Use    Smoking status: Never    Smokeless tobacco: Never    Tobacco comments:     No   Vaping Use    Vaping status: Never Used   Substance and Sexual Activity    Alcohol use: Never    Drug use: Never   Other Topics Concern    Caffeine Concern No    Exercise No    Seat Belt Yes    Special Diet No    Stress Concern No    Weight Concern No     Family History:  History reviewed. No pertinent family history.  Allergies:  Allergies[1]  Current Meds:  Medications Ordered Prior to Encounter[2]      REVIEW OF SYSTEMS   Constitutional: no fatigue normal energy no weight changes   HENT: normal sinuses and no mouth issues   Eyes: . normal  vision no eye pain   Respiratory: normal respirations no cough   Cardiovascular: no CP, or palpitations   Gastrointestinal: normal bowels and no abd pains   Genitourinary:  normal urination no hematuria, no frequency   Musculoskeletal: no pains in arms/legs, normal range of motion   Skin: no rashes or skin lesions that are new   Neurological:  no weakness, no numbness, normal gait   Hematological:  no bruises or bleeding   Psychiatric/Behavioral: normal mood no anxiety normal behavior     /68 (BP Location: Right arm, Patient Position: Sitting, Cuff Size: adult)   Pulse 81   Temp 97.8 °F (36.6 °C) (Temporal)   Resp 16   Ht 5' 5\" (1.651 m)   Wt 148 lb (67.1 kg)   LMP 11/06/2024 (Exact Date)   SpO2 98%   BMI 24.63 kg/m²     PHYSICAL EXAM:   Constitutional: Vital signs reviewed as noted, well developed, in no acute distress.   HENT: NCAT  Eyes: pupils reactive bilaterally  Cardiovascular: nl s1 s2 no m/r/g  Pulmonary/Chest: CTA bilaterally with no wheezes  Abdominal: Soft NT normal Bowel sounds  Extremities: no pedal edema   Neurological:  no weakness in UE and LE, reflexes are normal  Skin: no rashes or bruises on visualized skin, not undressed   Psychiatric:normal mood              [1] No Known Allergies  [2]   Current Outpatient Medications on File Prior to Visit   Medication Sig Dispense Refill    DULoxetine (CYMBALTA) 30 MG Oral Cap DR Particles Take 1 capsule (30 mg total) by mouth at bedtime. (Patient not taking: Reported on 11/19/2024) 90 capsule 0    levothyroxine 25 MCG Oral Tab Take 1 tablet (25 mcg total) by mouth before breakfast. (Patient not taking: Reported on 11/19/2024) 90 tablet 1    Ferrous Sulfate 325 (65 Fe) MG Oral Tab Take 1 tablet (325 mg total) by mouth daily with breakfast. (Patient not taking: Reported on 11/19/2024) 90 tablet 1    cyanocobalamin 1000 MCG Oral Tab Take 1 tablet (1,000 mcg total) by mouth daily. (Patient not taking: Reported on 11/19/2024) 90 tablet 0     hydrocortisone 2.5 % External Cream Apply twice a day for 2 weeks to affected area then stop (Patient not taking: Reported on 11/19/2024) 20 g 0    levothyroxine 25 MCG Oral Tab Take 1 tablet (25 mcg total) by mouth before breakfast. (Patient not taking: Reported on 11/19/2024) 90 tablet 1     No current facility-administered medications on file prior to visit.

## 2024-11-20 DIAGNOSIS — D50.0 IRON DEFICIENCY ANEMIA DUE TO CHRONIC BLOOD LOSS: ICD-10-CM

## 2024-11-20 DIAGNOSIS — E03.9 ACQUIRED HYPOTHYROIDISM: Primary | ICD-10-CM

## 2024-11-20 PROBLEM — E78.2 MIXED HYPERLIPIDEMIA: Status: ACTIVE | Noted: 2024-11-20

## 2024-11-20 RX ORDER — FERROUS SULFATE 325(65) MG
325 TABLET ORAL
Qty: 90 TABLET | Refills: 3 | Status: SHIPPED | OUTPATIENT
Start: 2024-11-20

## 2024-11-20 RX ORDER — LEVOTHYROXINE SODIUM 50 UG/1
50 TABLET ORAL
Qty: 90 TABLET | Refills: 0 | Status: SHIPPED | OUTPATIENT
Start: 2024-11-20

## 2025-04-09 ENCOUNTER — PATIENT MESSAGE (OUTPATIENT)
Dept: INTERNAL MEDICINE CLINIC | Facility: CLINIC | Age: 35
End: 2025-04-09

## 2025-04-29 ENCOUNTER — OFFICE VISIT (OUTPATIENT)
Dept: OBGYN CLINIC | Facility: CLINIC | Age: 35
End: 2025-04-29
Payer: COMMERCIAL

## 2025-04-29 VITALS
BODY MASS INDEX: 24.6 KG/M2 | HEIGHT: 65 IN | SYSTOLIC BLOOD PRESSURE: 112 MMHG | DIASTOLIC BLOOD PRESSURE: 80 MMHG | HEART RATE: 85 BPM | WEIGHT: 147.63 LBS

## 2025-04-29 DIAGNOSIS — R68.82 LOW LIBIDO: ICD-10-CM

## 2025-04-29 DIAGNOSIS — Z12.4 CERVICAL CANCER SCREENING: ICD-10-CM

## 2025-04-29 DIAGNOSIS — N81.10 PROLAPSE OF VAGINAL WALL: Primary | ICD-10-CM

## 2025-04-29 PROCEDURE — 87591 N.GONORRHOEAE DNA AMP PROB: CPT | Performed by: STUDENT IN AN ORGANIZED HEALTH CARE EDUCATION/TRAINING PROGRAM

## 2025-04-29 PROCEDURE — 88175 CYTOPATH C/V AUTO FLUID REDO: CPT | Performed by: STUDENT IN AN ORGANIZED HEALTH CARE EDUCATION/TRAINING PROGRAM

## 2025-04-29 PROCEDURE — 3079F DIAST BP 80-89 MM HG: CPT | Performed by: STUDENT IN AN ORGANIZED HEALTH CARE EDUCATION/TRAINING PROGRAM

## 2025-04-29 PROCEDURE — 87624 HPV HI-RISK TYP POOLED RSLT: CPT | Performed by: STUDENT IN AN ORGANIZED HEALTH CARE EDUCATION/TRAINING PROGRAM

## 2025-04-29 PROCEDURE — 3008F BODY MASS INDEX DOCD: CPT | Performed by: STUDENT IN AN ORGANIZED HEALTH CARE EDUCATION/TRAINING PROGRAM

## 2025-04-29 PROCEDURE — 3074F SYST BP LT 130 MM HG: CPT | Performed by: STUDENT IN AN ORGANIZED HEALTH CARE EDUCATION/TRAINING PROGRAM

## 2025-04-29 PROCEDURE — 99203 OFFICE O/P NEW LOW 30 MIN: CPT | Performed by: STUDENT IN AN ORGANIZED HEALTH CARE EDUCATION/TRAINING PROGRAM

## 2025-04-29 PROCEDURE — 87491 CHLMYD TRACH DNA AMP PROBE: CPT | Performed by: STUDENT IN AN ORGANIZED HEALTH CARE EDUCATION/TRAINING PROGRAM

## 2025-04-29 RX ORDER — BUPROPION HYDROCHLORIDE 300 MG/1
300 TABLET ORAL DAILY
Qty: 90 TABLET | Refills: 1 | Status: SHIPPED | OUTPATIENT
Start: 2025-04-29

## 2025-04-29 NOTE — PROGRESS NOTES
GYN PROBLEM VISIT    Chief Complaint   Patient presents with    Pelvic Pain     Patient states she has been feeling some discomfort in her pelvic area for a while. Patient states she feels it more on the outside.         Subjective:   This is a 35 year old  presenting for GYN visit.     While using the restroom, she noticed discomfort and something new coming down along her vaginal opening. She denies pelvic pain. She denies vaginal discharge or odor. She does report prolonged standing with her job. Denies leakage of urine. Denies feeling like she cannot empty her bladder.     Also reports decreased libido. She would like to try a medication to improve her sexual desire. She denies pain with intercourse. When her  initiates intercourse, she is not really in the mood. Does not initiate intercourse herself. These symptoms have been occurring over several months. She does admit that her relationship may not be stable. However, she does not believe her  will go to therapy.     Menarche: 11 (2025  1:05 PM)  Period Cycle (Days): 28-30 days (2025  1:05 PM)  Period Duration (Days): 4-5 days (2025  1:05 PM)  Period Flow: moderate (2025  1:05 PM)  Use of Birth Control (if yes, specify type): None (2025  1:05 PM)  Hx Prior Abnormal Pap: No (2025  1:05 PM)  Pap Result Notes: pt unsure of last pap smear (2025  1:05 PM)      Review of Systems   Constitutional: Negative.    HENT: Negative.    Respiratory: Negative.    Gastrointestinal: Negative.    Endocrine: Negative.    Genitourinary: Negative.    Musculoskeletal: Negative.    Skin: Negative.    Allergic/Immunologic: Negative.    Neurological: Negative.      Past Medical History[1]    Past Surgical History[2]    Allergies[3]    Current Medications[4]      Objective:    Physical Exam     Vitals:    25 1307   BP: 112/80   Pulse: 85        Constitutional: She is oriented to person, place, and time. She appears  well-developed and well-nourished.   Genitourinary: Normal appearing external genitalia. Vagina is well estrogenized. Normal appearing urethral meatus. Bartholin's gland normal to palpation. Normal appearing cervix. Cervix is not friable and with normal appearing discharge. Anterior vaginal wall prolapse, stage 2. Minimal apical prolapse    Assessment/Plan:  This is a 35 year old  presenting with:    #POP  -reassurance provided  -pelvic floor PT ordered    #low libido  -trial of wellbutrin  -encouraged therapy    Fred Luo MD         [1] History reviewed. No pertinent past medical history.  [2]   Past Surgical History:  Procedure Laterality Date         [3] No Known Allergies  [4]    levothyroxine 50 MCG Oral Tab Take 1 tablet (50 mcg total) by mouth before breakfast. 90 tablet 0

## 2025-04-29 NOTE — PATIENT INSTRUCTIONS
[facility] in [place] [address] [place] [address] [address]                                [place] [address]               [phone]

## 2025-04-30 ENCOUNTER — PATIENT MESSAGE (OUTPATIENT)
Dept: OBGYN CLINIC | Facility: CLINIC | Age: 35
End: 2025-04-30

## 2025-04-30 LAB
C TRACH DNA SPEC QL NAA+PROBE: NEGATIVE
HPV E6+E7 MRNA CVX QL NAA+PROBE: NEGATIVE
N GONORRHOEA DNA SPEC QL NAA+PROBE: NEGATIVE

## 2025-05-05 NOTE — TELEPHONE ENCOUNTER
Routed to provider-  -Per 4/29/2025 visit notes patient has anterior vaginal wall prolapse, state 2, with minimal apical prolapse  -Please advise on precautions for sexual intercourse, work/standing for 3 hours

## 2025-05-08 ENCOUNTER — OFFICE VISIT (OUTPATIENT)
Dept: INTERNAL MEDICINE CLINIC | Facility: CLINIC | Age: 35
End: 2025-05-08
Payer: COMMERCIAL

## 2025-05-08 ENCOUNTER — LAB ENCOUNTER (OUTPATIENT)
Dept: LAB | Age: 35
End: 2025-05-08
Attending: INTERNAL MEDICINE
Payer: COMMERCIAL

## 2025-05-08 VITALS
BODY MASS INDEX: 23.33 KG/M2 | DIASTOLIC BLOOD PRESSURE: 66 MMHG | WEIGHT: 145.19 LBS | HEART RATE: 89 BPM | HEIGHT: 66 IN | SYSTOLIC BLOOD PRESSURE: 106 MMHG | OXYGEN SATURATION: 100 % | RESPIRATION RATE: 16 BRPM | TEMPERATURE: 98 F

## 2025-05-08 DIAGNOSIS — N81.10 VAGINAL WALL PROLAPSE: ICD-10-CM

## 2025-05-08 DIAGNOSIS — Z00.00 ROUTINE PHYSICAL EXAMINATION: Primary | ICD-10-CM

## 2025-05-08 DIAGNOSIS — E03.9 ACQUIRED HYPOTHYROIDISM: ICD-10-CM

## 2025-05-08 DIAGNOSIS — E78.2 MIXED HYPERLIPIDEMIA: ICD-10-CM

## 2025-05-08 DIAGNOSIS — D50.0 IRON DEFICIENCY ANEMIA DUE TO CHRONIC BLOOD LOSS: ICD-10-CM

## 2025-05-08 DIAGNOSIS — E55.9 VITAMIN D DEFICIENCY: ICD-10-CM

## 2025-05-08 DIAGNOSIS — Z00.00 ROUTINE PHYSICAL EXAMINATION: ICD-10-CM

## 2025-05-08 DIAGNOSIS — E53.8 VITAMIN B12 DEFICIENCY: ICD-10-CM

## 2025-05-08 LAB
ALT SERPL-CCNC: 17 U/L (ref 10–49)
ANION GAP SERPL CALC-SCNC: 9 MMOL/L (ref 0–18)
AST SERPL-CCNC: 24 U/L (ref ?–34)
BASOPHILS # BLD AUTO: 0.03 X10(3) UL (ref 0–0.2)
BASOPHILS NFR BLD AUTO: 0.4 %
BUN BLD-MCNC: 8 MG/DL (ref 9–23)
CALCIUM BLD-MCNC: 9.6 MG/DL (ref 8.7–10.6)
CHLORIDE SERPL-SCNC: 106 MMOL/L (ref 98–112)
CHOLEST SERPL-MCNC: 179 MG/DL (ref ?–200)
CO2 SERPL-SCNC: 22 MMOL/L (ref 21–32)
CREAT BLD-MCNC: 0.89 MG/DL (ref 0.55–1.02)
DEPRECATED HBV CORE AB SER IA-ACNC: 6 NG/ML (ref 50–306)
EGFRCR SERPLBLD CKD-EPI 2021: 86 ML/MIN/1.73M2 (ref 60–?)
EOSINOPHIL # BLD AUTO: 0.09 X10(3) UL (ref 0–0.7)
EOSINOPHIL NFR BLD AUTO: 1.3 %
ERYTHROCYTE [DISTWIDTH] IN BLOOD BY AUTOMATED COUNT: 14.5 %
EST. AVERAGE GLUCOSE BLD GHB EST-MCNC: 117 MG/DL (ref 68–126)
FASTING PATIENT LIPID ANSWER: NO
FASTING STATUS PATIENT QL REPORTED: NO
GLUCOSE BLD-MCNC: 102 MG/DL (ref 70–99)
HBA1C MFR BLD: 5.7 % (ref ?–5.7)
HCT VFR BLD AUTO: 37.7 % (ref 35–48)
HDLC SERPL-MCNC: 38 MG/DL (ref 40–59)
HGB BLD-MCNC: 11.7 G/DL (ref 12–16)
IMM GRANULOCYTES # BLD AUTO: 0.02 X10(3) UL (ref 0–1)
IMM GRANULOCYTES NFR BLD: 0.3 %
LDLC SERPL CALC-MCNC: 115 MG/DL (ref ?–100)
LYMPHOCYTES # BLD AUTO: 1.91 X10(3) UL (ref 1–4)
LYMPHOCYTES NFR BLD AUTO: 28.6 %
MCH RBC QN AUTO: 25.2 PG (ref 26–34)
MCHC RBC AUTO-ENTMCNC: 31 G/DL (ref 31–37)
MCV RBC AUTO: 81.3 FL (ref 80–100)
MONOCYTES # BLD AUTO: 0.35 X10(3) UL (ref 0.1–1)
MONOCYTES NFR BLD AUTO: 5.2 %
NEUTROPHILS # BLD AUTO: 4.28 X10 (3) UL (ref 1.5–7.7)
NEUTROPHILS # BLD AUTO: 4.28 X10(3) UL (ref 1.5–7.7)
NEUTROPHILS NFR BLD AUTO: 64.2 %
NONHDLC SERPL-MCNC: 141 MG/DL (ref ?–130)
OSMOLALITY SERPL CALC.SUM OF ELEC: 283 MOSM/KG (ref 275–295)
PLATELET # BLD AUTO: 348 10(3)UL (ref 150–450)
POTASSIUM SERPL-SCNC: 4.2 MMOL/L (ref 3.5–5.1)
RBC # BLD AUTO: 4.64 X10(6)UL (ref 3.8–5.3)
SODIUM SERPL-SCNC: 137 MMOL/L (ref 136–145)
TRIGL SERPL-MCNC: 143 MG/DL (ref 30–149)
TSI SER-ACNC: 4 UIU/ML (ref 0.55–4.78)
VIT B12 SERPL-MCNC: 223 PG/ML (ref 211–911)
VIT D+METAB SERPL-MCNC: 23 NG/ML (ref 30–100)
VLDLC SERPL CALC-MCNC: 25 MG/DL (ref 0–30)
WBC # BLD AUTO: 6.7 X10(3) UL (ref 4–11)

## 2025-05-08 PROCEDURE — 84460 ALANINE AMINO (ALT) (SGPT): CPT

## 2025-05-08 PROCEDURE — 99395 PREV VISIT EST AGE 18-39: CPT | Performed by: INTERNAL MEDICINE

## 2025-05-08 PROCEDURE — 82306 VITAMIN D 25 HYDROXY: CPT

## 2025-05-08 PROCEDURE — 84443 ASSAY THYROID STIM HORMONE: CPT

## 2025-05-08 PROCEDURE — 84450 TRANSFERASE (AST) (SGOT): CPT

## 2025-05-08 PROCEDURE — 80061 LIPID PANEL: CPT

## 2025-05-08 PROCEDURE — 82728 ASSAY OF FERRITIN: CPT

## 2025-05-08 PROCEDURE — 36415 COLL VENOUS BLD VENIPUNCTURE: CPT

## 2025-05-08 PROCEDURE — 3078F DIAST BP <80 MM HG: CPT | Performed by: INTERNAL MEDICINE

## 2025-05-08 PROCEDURE — 82607 VITAMIN B-12: CPT

## 2025-05-08 PROCEDURE — 3074F SYST BP LT 130 MM HG: CPT | Performed by: INTERNAL MEDICINE

## 2025-05-08 PROCEDURE — 83036 HEMOGLOBIN GLYCOSYLATED A1C: CPT

## 2025-05-08 PROCEDURE — 80048 BASIC METABOLIC PNL TOTAL CA: CPT

## 2025-05-08 PROCEDURE — 3008F BODY MASS INDEX DOCD: CPT | Performed by: INTERNAL MEDICINE

## 2025-05-08 PROCEDURE — 85025 COMPLETE CBC W/AUTO DIFF WBC: CPT

## 2025-05-08 RX ORDER — PNV NO.95/FERROUS FUM/FOLIC AC 28MG-0.8MG
1 TABLET ORAL 2 TIMES DAILY
COMMUNITY

## 2025-05-08 NOTE — PATIENT INSTRUCTIONS
Continue to exercise at least 150 minutes a week and Eat a plant based diet     Please take 2000 IU of vitamin D daily for life to keep your bones strong    Please see your dentist every 6 months    Continue with your regular eye exams    I have ordered blood tests for you    Please follow up with the urogynecologist and start PT    I highly recommend the tdap vaccine    See me yearly for a physical

## 2025-05-08 NOTE — PROGRESS NOTES
Patient Office Visit    ASSESSMENT AND PLAN:   1. Routine physical examination  Note: Continue to exercise at least 150 minutes a week and Eat a plant based diet. Please take 2000 IU of vitamin D daily for life to keep your bones strong. Please see your dentist every 6 months.  Continue with regular eye exams.  Highly recommended the Tdap vaccine  - ALT(SGPT); Future  - AST (SGOT); Future  - Basic Metabolic Panel (8); Future  - Lipid Panel; Future  - CBC With Differential With Platelet; Future  - Hemoglobin A1C; Future  - TSH W Reflex To Free T4; Future  - Vitamin D; Future  - Vitamin B12; Future    2. Vaginal wall prolapse  Note: Advised to start physical therapy  - Urogynecology Referral - External    3. Acquired hypothyroidism  Note: Continue levothyroxine  - TSH W Reflex To Free T4; Future    4. Iron deficiency anemia due to chronic blood loss  Note: Continue iron supplement    5. Mixed hyperlipidemia  Note: Diet controlled  - ALT(SGPT); Future  - AST (SGOT); Future  - Lipid Panel; Future    6. Vitamin B12 deficiency  Note: Continue supplement  - Vitamin B12; Future    7. Vitamin D deficiency  Note: Continue supplement  - Vitamin D; Future      Return to clinic in a year for physical    Patient/Caregiver Education: Patient/Caregiver Education: There are no barriers to learning. Medical education done. Outcome: Patient verbalizes understanding. Patient is notified to call with any questions, complications, allergies, or worsening or changing symptoms.  Patient is to call with any side effects or complications from the treatments as a result of today.      Reviewed Past Medical History and   Problem List[1]    Orders Placed This Encounter   Procedures    ALT(SGPT)     Standing Status:   Future     Number of Occurrences:   1     Expected Date:   5/8/2025     Expiration Date:   5/8/2026    AST (SGOT)     Standing Status:   Future     Number of Occurrences:   1     Expected Date:   5/8/2025     Expiration Date:    5/8/2026    Basic Metabolic Panel (8)     Standing Status:   Future     Number of Occurrences:   1     Expected Date:   5/8/2025     Expiration Date:   5/8/2026    Lipid Panel     Standing Status:   Future     Number of Occurrences:   1     Expected Date:   5/8/2025     Expiration Date:   5/8/2026    CBC With Differential With Platelet     Standing Status:   Future     Number of Occurrences:   1     Expected Date:   5/8/2025     Expiration Date:   5/8/2026    Hemoglobin A1C     Standing Status:   Future     Number of Occurrences:   1     Expected Date:   5/8/2025     Expiration Date:   5/8/2026    TSH W Reflex To Free T4     Standing Status:   Future     Number of Occurrences:   1     Expected Date:   5/8/2025     Expiration Date:   5/8/2026    Vitamin D     Standing Status:   Future     Number of Occurrences:   1     Expected Date:   5/8/2025     Expiration Date:   5/8/2026     Please pick the scenario that best fits the purpose for ordering this test:   General Screening/Vit D deficiency (25-Hydroxy)     Release to patient:   Immediate    Vitamin B12     Standing Status:   Future     Number of Occurrences:   1     Expected Date:   5/8/2025     Expiration Date:   5/8/2026     Requested Prescriptions      No prescriptions requested or ordered in this encounter         Daisy Miller DO  CC:  Chief Complaint   Patient presents with    Physical         HPI:   Sarai Young is a 35-year-old female who presents for a physical    Vaginal prolapse: She recently saw the gynecologist and has a vaginal prolapse.  She would like to get another opinion from the urogynecologist and she is considering starting physical therapy  Hypothyroid: Stable on medicine  Iron deficiency: She has been taking her iron supplement regularly    Past Medical History[2]    Past Surgical History[3]    Social History:  Short Social Hx on File[4]  Family History:  Family History[5]  Allergies:  Allergies[6]  Current Meds:  Medications  Ordered Prior to Encounter[7]      REVIEW OF SYSTEMS   Constitutional: no fatigue normal energy no weight changes   HENT: normal sinuses and no mouth issues   Eyes: . normal vision no eye pain   Respiratory: normal respirations no cough   Cardiovascular: no CP, or palpitations   Gastrointestinal: normal bowels and no abd pains   Genitourinary:  normal urination no hematuria, no frequency   Musculoskeletal: no pains in arms/legs, normal range of motion   Skin: no rashes or skin lesions that are new   Neurological:  no weakness, no numbness, normal gait   Hematological:  no bruises or bleeding   Psychiatric/Behavioral: normal mood no anxiety normal behavior     /66   Pulse 89   Temp 98.1 °F (36.7 °C) (Temporal)   Resp 16   Ht 5' 6\" (1.676 m)   Wt 145 lb 3.2 oz (65.9 kg)   LMP 2025 (Exact Date)   SpO2 100%   BMI 23.44 kg/m²     PHYSICAL EXAM:   Constitutional: Vital signs reviewed as noted, well developed, in no acute distress.   HENT: NCAT, bilateral ear canal and tympanic membrane appear normal  Eyes: pupils reactive bilaterally  Neck: No thyroidmegaly  Cardiovascular: nl s1 s2 no m/r/g  Pulmonary/Chest: CTA bilaterally with no wheezes  Abdominal: Soft NT normal Bowel sounds  Extremities: no pedal edema   Neurological:  no weakness in UE and LE, reflexes are normal  Skin: no rashes or bruises on visualized skin, not undressed   Psychiatric:normal mood                [1]   Patient Active Problem List  Diagnosis    Acquired hypothyroidism    Iron deficiency anemia due to chronic blood loss    Vitamin B12 deficiency    Vitamin D deficiency    Mixed hyperlipidemia   [2] History reviewed. No pertinent past medical history.  [3]   Past Surgical History:  Procedure Laterality Date         [4]   Social History  Socioeconomic History    Marital status:    Tobacco Use    Smoking status: Never    Smokeless tobacco: Never    Tobacco comments:     No   Vaping Use    Vaping status: Never Used    Substance and Sexual Activity    Alcohol use: Never    Drug use: Never    Sexual activity: Yes     Partners: Male   Other Topics Concern    Caffeine Concern No    Exercise No    Seat Belt Yes    Special Diet No    Stress Concern No    Weight Concern No     Social Drivers of Health     Food Insecurity: No Food Insecurity (5/8/2025)    NCSS - Food Insecurity     Worried About Running Out of Food in the Last Year: No     Ran Out of Food in the Last Year: No   Transportation Needs: No Transportation Needs (5/8/2025)    NCSS - Transportation     Lack of Transportation: No   Housing Stability: Not At Risk (5/8/2025)    NCSS - Housing/Utilities     Has Housing: Yes     Worried About Losing Housing: No     Unable to Get Utilities: No   [5] History reviewed. No pertinent family history.  [6] No Known Allergies  [7]   Current Outpatient Medications on File Prior to Visit   Medication Sig Dispense Refill    Ferrous Sulfate (IRON) 325 (65 Fe) MG Oral Tab Take 1 tablet by mouth in the morning and 1 tablet before bedtime.      levothyroxine 50 MCG Oral Tab Take 1 tablet (50 mcg total) by mouth before breakfast. 90 tablet 0     No current facility-administered medications on file prior to visit.

## 2025-05-09 ENCOUNTER — PATIENT MESSAGE (OUTPATIENT)
Dept: INTERNAL MEDICINE CLINIC | Facility: CLINIC | Age: 35
End: 2025-05-09

## 2025-05-09 NOTE — TELEPHONE ENCOUNTER
Fred Luo MD to [facility] Nurse (Selected Message)        5/7/25  7:28 PM  It is safe to have sex. 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 However, prolonged standing, chronic coughs, heavy lifting, vaginal deliveries are risk factors for worsening prolapse. Prolapse in of itself is not dangerous to her health, just bothersome.

## 2025-05-23 ENCOUNTER — OFFICE VISIT (OUTPATIENT)
Dept: UROLOGY | Facility: HOSPITAL | Age: 35
End: 2025-05-23
Attending: OBSTETRICS & GYNECOLOGY
Payer: COMMERCIAL

## 2025-05-23 VITALS — RESPIRATION RATE: 16 BRPM | HEIGHT: 66 IN | BODY MASS INDEX: 22.68 KG/M2 | WEIGHT: 141.13 LBS

## 2025-05-23 DIAGNOSIS — N81.84 PELVIC MUSCLE WASTING: ICD-10-CM

## 2025-05-23 DIAGNOSIS — N81.2 UTEROVAGINAL PROLAPSE, INCOMPLETE: Primary | ICD-10-CM

## 2025-05-23 DIAGNOSIS — N94.10 DYSPAREUNIA, FEMALE: ICD-10-CM

## 2025-05-23 PROCEDURE — 99202 OFFICE O/P NEW SF 15 MIN: CPT

## 2025-05-23 NOTE — PROGRESS NOTES
Estrellita Anders, DO  2025     Referred by Dr. Luo (PCP Dr Miller)  Pt here with     Chief Complaint   Patient presents with    Other     C/o bulge for past few months and dyspareunia       HPI:  No VIRAL  No UUI  Nocturia x0  +prolapse sx  Bulge sx x several months  +dyspareunia  Reports some discomfort with sitting  Constipated bowels    PRIOR TREATMENTS:    Kegels  Planning PFPT in     no UTIs  No gross hematuria    , c/sx2  BS  Completed childbearing    Vitals:  Resp 16   Ht 66\"   Wt 141 lb 1.5 oz (64 kg)   LMP 2025 (Exact Date)   BMI 22.77 kg/m²      HISTORY:  Past Medical History[1]   Past Surgical History[2]   Family History[3]   Short Social Hx on File[4]     Allergies:  Allergies[5]    Medications:  Medications Prior to Visit[6]    Urogynecology Summary:  Urogynecology Summary  Prolapse: Yes  VIRAL: No  Urge Incontinence: No  Nocturia Frequency: 0  Frequency: Greater than 3 hours  Incomplete emptying: No  Constipation: Yes (\"sometimes\", reviewed bowel regimen)  Wears pad day?: 0  Wears Pad Night?: 0  Activities are limited by UI/POP?: Yes  Currently Sexually Active: Yes (reports discomfort r/t bulge)    Review of Systems:    A comprehensive 12 point review of systems was completed.  Pertinent positives noted in the the HPI.  No CP  No SOB    GENERAL EXAM:  GENERAL:  Alert and Oriented, and NAD  HEENT:  Normal, no lesions  LUNGS:  Normal effort  HEART:  RRR  ABDOMEN: soft, no mass, no hernia  EXTREM:  Normal, no edema  SKIN:  Normal, no lesions    PELVIC EXAM:  Ext. Gen: no atrophy, no lesions  Urethra: no atrophy, nontender  Bladder:some fullness, nontender  Vagina: early atrophy  Cervix: scant bleeding c/w menses, no lesions, nontender  Uterus: +mobile  Adnexa:no masses, nontender  Perineum: nontender  Anus: wnl  Rectum: defer    PELVIS FLOOR NEUROMUSCULAR FUNCTION:  Strength:  1, Unable to hold greater than 3 sec, and Increased tone  Perineal Sensation:  Normal      PELVIC  SUPPORT:  [place]:  2  Ant:  2  Post:  1  CST:  negative  UVJ: somewhat hypermobile    Pt examined with chaperone, RN    Impression/Plan:    ICD-10-CM    1. Uterovaginal prolapse, incomplete  N81.2       2. Pelvic muscle wasting  N81.84       3. Dyspareunia, female  N94.10           Discussion Items:   Urodynamics and cystoscopy for evaluation of LUTS  Nonsurgical and surgical treatments for POP  Pelvic muscle rehabilitation including pelvic floor PT  Discussed dietary and behavioral modification, discussed pharmacologic and nonpharmacologic mgmt options for urinary symptoms. Discussed dietary & weight management with potential improvements in symptoms with weight loss.    Discussed management of pelvic organ prolapse including but not limited to behavioral modifications, conservative options, and surgical management.   Discussed pessary management including benefits and risks. Discussed importance of keeping regularly scheduled pessary checks in prevention of complications related to pessary use.     Discussed dyspareunia & mgmt with PFPT    Diagnostic Items:  none    Medications Discussed:  None    Treatment Plan, Non-surgical:   RN teaching/pt education done  PFPT    Treatment Plan, Surgical:   None    Pt verbalizes understanding of all above discussed information. All questions answered. She agrees to plan    Return in about 3 months (around 8/23/2025) for PT f/u.    Estrellita Anders, , FACOG, FACS      Discussion undertaken in English, info provided      The 21st Century Cures Act makes medical notes like these available to patients in the interest of transparency. However, be advised this is a medical document. It is intended as peer to peer communication. It is written in medical language and may contain abbreviations or verbiage that are unfamiliar. It may appear blunt or direct. Medical documents are intended to carry relevant information, facts as evident, and the clinical opinion of the practitioner.           [1]   Past Medical History:   Anemia    Hypothyroid   [2]   Past Surgical History:  Procedure Laterality Date          Salpingectomy Bilateral    [3] History reviewed. No pertinent family history.  [4]   Social History  Socioeconomic History    Marital status:    Tobacco Use    Smoking status: Never    Smokeless tobacco: Never    Tobacco comments:     No   Vaping Use    Vaping status: Never Used   Substance and Sexual Activity    Alcohol use: Never    Drug use: Never    Sexual activity: Yes     Partners: Male   Other Topics Concern    Caffeine Concern No    Exercise No    Seat Belt Yes    Special Diet No    Stress Concern No    Weight Concern No     Social Drivers of Health     Food Insecurity: No Food Insecurity (2025)    NCSS - Food Insecurity     Worried About Running Out of Food in the Last Year: No     Ran Out of Food in the Last Year: No   Transportation Needs: No Transportation Needs (2025)    NCSS - Transportation     Lack of Transportation: No   Housing Stability: Not At Risk (2025)    NCSS - Housing/Utilities     Has Housing: Yes     Worried About Losing Housing: No     Unable to Get Utilities: No   [5] No Known Allergies  [6]   Outpatient Medications Prior to Visit   Medication Sig Dispense Refill    Ferrous Sulfate (IRON) 325 (65 Fe) MG Oral Tab Take 1 tablet by mouth in the morning and 1 tablet before bedtime.      levothyroxine 50 MCG Oral Tab Take 1 tablet (50 mcg total) by mouth before breakfast. 90 tablet 0     No facility-administered medications prior to visit.

## 2025-06-02 ENCOUNTER — APPOINTMENT (OUTPATIENT)
Dept: PHYSICAL THERAPY | Age: 35
End: 2025-06-02
Attending: STUDENT IN AN ORGANIZED HEALTH CARE EDUCATION/TRAINING PROGRAM
Payer: COMMERCIAL

## 2025-06-07 ENCOUNTER — TELEPHONE (OUTPATIENT)
Dept: PHYSICAL THERAPY | Facility: HOSPITAL | Age: 35
End: 2025-06-07

## 2025-06-09 ENCOUNTER — OFFICE VISIT (OUTPATIENT)
Dept: PHYSICAL THERAPY | Age: 35
End: 2025-06-09
Attending: STUDENT IN AN ORGANIZED HEALTH CARE EDUCATION/TRAINING PROGRAM
Payer: COMMERCIAL

## 2025-06-09 DIAGNOSIS — N81.10 PROLAPSE OF VAGINAL WALL: Primary | ICD-10-CM

## 2025-06-09 PROCEDURE — 97162 PT EVAL MOD COMPLEX 30 MIN: CPT | Performed by: PHYSICAL THERAPIST

## 2025-06-09 PROCEDURE — 97112 NEUROMUSCULAR REEDUCATION: CPT | Performed by: PHYSICAL THERAPIST

## 2025-06-09 NOTE — PROGRESS NOTES
MUSCULOSKELETAL AND PELVIC FLOOR EVALUATION:     Diagnosis:   Prolapse of vaginal wall (N81.10) Patient:  Sarai Young (35 year old, female)        Referring Provider: Fred Luo  Today's Date   2025    Precautions:  None   Date of Evaluation: 25  Next MD visit: none scheduled  Date of Surgery: No data recorded     PATIENT SUMMARY   Pt is accompanied by her spouse.  Summary of chief complaints: deep pain & heaviness associated w/ POP  History of current condition: Pt reports prolapse for a couple of mo; reports was previously only experiencing pain & had 1 mo (April) where she had the bulge but now bulge itself is resolved   Pain level: current 0 /10, at worst 4 /10  Description of symptoms: hard stools, constipation. Usually BSS 3-4. No UI. Pain w/ POP unchanged w/ physical activity. About 3x/ day voiding. Rare nocturia. 8-10 glasses water/ day. +benefiber, miralax. Dietary fiber- some, not sure   Prior level of function: pt reports feeling her physical symptoms started around the time of some personal/ family issues, no other injuries or falls  Current limitations: strain w/ defecation w/ pain during defecation, decreased void frequencies, dyspareunia  Pt goals: I want to fix the bowel moment & the prolapse  Pregnant Now: No   OB History    Para Term  AB Living   2 2 2 0 0 2   SAB IAB Ectopic Multiple Live Births   0 0 0 0 2      # Outcome Date GA Lbr Mayank/2nd Weight Sex Type Anes PTL Lv   2 Term 14 38w0d   M Caesarean EPI  CHUCKY   1 Term 10/10/12 38w0d   M Caesarean EPI  CHUCKY     PFDI 20    Scores   POPDI 6:    8.33   CRAD 8:    37.5   DARA 6:    37.5   Summary:    83.33      Outpatient Therapy Pelvic Health Intake       Question 2025 11:00 PM CDT - Filed by Patient    Do you usually experience pressure in the lower abdomen? Not present    Do you usually experience heaviness or dullness in the pelvic area? Not present    Do you usually have a bulge or something falling  out that you can see or feel in your vaginal area? Not present    Do you ever have to push on the vagina or around the rectum to have or complete a bowel movement? Somewhat    Do you usually experience a feeling of incomplete bladder emptying? Not present    Do you ever have to push up on a bulge in the vaginal area with your fingers to start or complete urination? Not present    Please provide details on the following urinary history / complaints     Frequency of urination: # of times/day 3    Frequency of urination: # of times/night 1    When you have the urge to urinate, how long can you delay before you have to go to the toilet? (# of minutes or hours) Wait for some time    Do you have a history of urinary tract infections: No    Do you have a history of urine loss (select all that apply)       How many times a day does leakage occur? 0    If you have leakage, what type(s) of protective device(s) do you use? (Select all that apply) None    On average, how many pads do you use each day? 0    Do you soak the pad fully? No    Do you change the pad each time it is wet? No    Do you experience any of these symptoms? (Select all that apply)       Do you usually experience frequent urination? Not at all    Do you usually experience urine leakage associated with a feeling of urgency, that is, a strong sensation of needing to go to the bathroom? Not at all    Do you usually experience urine leakage related to coughing, sneezing, or laughing? Not at all    Do you usually experience small amounts of urine leakage (that is, drops)? Not at all    Do you usually experience difficulty empyting your bladder? Not at all    Do you usually experience pain or discomfort in the lower abdomen or genital region? Quite a bit    Have you ever had any of the following treatment:     Have you ever done exercises to control urine loss? If so, for how long? No    Has your doctor ever prescribed any medication for urine loss? No    Have you  had any surgical procedures to treat urine loss? No    Please provide details on the following bowel history / complaints.     How many bowel movements do you have per day? 2    How many bowel movements do you have per night? 0    Do you experience diarrhea? If yes, how often? No    Do you feel you need to strain too hard to have a bowel movement? Moderately    Do you feel you have not completely emptied your bowels at the end of a bowel movement? Not at all    Do you usually lose stool beyond your control if your stool is well formed? Not at all    Do you usually lose stool beyond your control if your stool is loose? Not at all    Do you usually lose gas from the rectum beyond your control? Not at all    Do you usually have pain when you pass your stool? Moderately    Do you experience strong sense of urgency and have to rush to bathroom for bowel movement? Not at all    Does part of bowel ever pass through rectum and bulge outside during/after bowel movement? Not at all    Please provide details on your daily fluid/food intake.     On average, what is your daily fluid intake (1 glass=8ounces)? (# of glasses per day) 10    How many are caffeinated? (# of glasses per day) 0    Do you restrict fluids because of incontenence (leakage)? No    Do you include fiber in your diet (fruits, vegetables, bran, etc.)? Yes    Psychosocial information     Do you live alone? No    Which recreational activities do you participate in if any? No    Have you had to restrict your activities due to your pelvic health concerns? No    On a scale of 0 (no impairments) to 10 (severe impairments), what are your feelings about your urinary or bowel incontinence or pelvic pain? 3    Do you have pain with intercourse? No    Have you had any changes in intimate relationships/sexual function due to your symptoms?  No    Your personal safety is of utmost importance to us at [facility], please answer the following questions:     Are  you being hurt, frightened, demeaned, or taken advantage of by anyone at your home or in your life?  No    Have you recently had thoughts of hurting yourself? No    Have you tried to hurt yourself in the past?  No    Pelvic Organ Prolapse Distress Inventory 6 Score (range: 0 - 100) 8.33    Colorectal-Anal Distress Inventory 8 Score (range: 0 - 100) 37.5    Urinary Distress Inventory 6 Score (range: 0 - 100) 37.5    PFDI Summary Score (range: 0 - 300) 83.33          Sexual Health Status  Sexual intercourse status: active, +dyspareunia (no post-coital pain)     Past medical history was reviewed with Sarai.  Significant findings include:   varied whole body pains, not sure if hormone related  Sarai  has a past medical history of Anemia and Hypothyroid.  She  has a past surgical history that includes  and salpingectomy (Bilateral).    ASSESSMENT  Sarai presents to physical therapy evaluation with primary c/o deep pain & heaviness associated w/ POP. The results of the objective tests and measures show external impairments in posture, breathing mechanics, abdominal bracing, strength, ROM, flexibility, w/ additional external & internal (w/ pt's informed consent) TBD subsequent visit. Functional deficits include but are not limited to strain w/ defecation w/ pain during defecation, decreased void frequencies, dyspareunia. Signs and symptoms are consistent with diagnosis of Prolapse of vaginal wall (N81.10). Pt and PT discussed evaluation findings, pathology, POC and HEP. Pt voiced understanding and performs HEP correctly without reported pain. Skilled Physical Therapy is medically necessary to address the above impairments and reach functional goals.    OBJECTIVE:   Consent obtained w/ all palpation listed below   Musculoskeletal  Posture: Posture: ant pelvis   Pelvic Alignment: NT  External Palpation: increased tension along R/L TL PSM; no TTP along ascending/ transverse/ descending colon   Scars  (location/surgery): low transverse  w/ mild to mod restriction in superior fascial glide  Abdominal Wall Integrity: soft, pain-free, mild bogginess   Diastasis Recti: negative, above umbilicus negative, below umbilicus negative         Special Tests: Breathing Mechanics: decreased posterolateral expansion w/ inhalation. Abdominal Bracing: +coning; increased upper ab & oblique activation.    Informed consent for internal pelvic evaluation given:-- (defer to subsequent visit)     ROM and Strength  (* denotes performed with pain)  Trunk ROM     Flex Min restricted     Ext Mod restricted    R L     Side bend Mod restricted Mod restricted     Rotation Mod restricted Mod restricted   ,   Hip   PROM MMT (-/5)    R L R L     Flex (L2) WNL WNL NT NT     Ext  NT NT 4- 4-     Abd NT NT 3 3     ER WNL WNL NT NT     IR Mod restricted Mod restricted NT NT        Flexibility:  LE Flexibility R L     Hip Flexor not tested not tested     Hamstrings mod restricted mod restricted     ITB not tested not tested     Piriformis mod restricted mod restricted     Quads WNL WNL     Gastroc-soleus not tested not tested        Today's Treatment and Response:   Pt education was provided on exam findings, treatment diagnosis, treatment plan, expectations, and prognosis.    Today's Treatment       2025   Pelvic Treatment   Neuro Re-Education Supine TA bracing w/ breathing coordination, w/ 1 pillow under hips for pelvic elevation gravity assist  Squatty potty   Education Discussed causes of constipation and strategies for mitigating symptoms including increased fiber, water, and exercise. Discussed use of squatty potty to optimize PFM relaxation and promote bowel emptying without straining. Discussed common contributing factors to pelvic organ prolapse and management of symptoms via PFM strengthening, as well as improved pressure management with functional lifting, bowel movements, etc. Discussed performance of PFM exercise with  LEs/hip elevated to promote repositioning of pelvic organs.      Neuro Re-Educ Minutes 10   Evaluation Minutes 35   Total Time Of Timed Procedures 10   Total Time Of Service-Based Procedures 35   Total Treatment Time 45   HEP Access Code: [email or web address]  URL: [email or web address]  Date: 06/09/2025  Prepared by: Angela Astudillo    Exercises  - Supine Transversus Abdominis Bracing - Hands on Stomach  - 1 x daily - 7 x weekly - 3 min duration  - Supine Diaphragmatic Breathing  - 1 x daily - 7 x weekly - 4-7 sec hold - 4 sec inhale - 8 sec exhale - 3 min duration    Patient Education  - Bowel Emptying Techniques        Patient was instructed in and issued a HEP for: Access Code: [email or web address]  URL: [email or web address]  Date: 06/09/2025  Prepared by: Angela Astudillo    Exercises  - Supine Transversus Abdominis Bracing - Hands on Stomach  - 1 x daily - 7 x weekly - 3 min duration  - Supine Diaphragmatic Breathing  - 1 x daily - 7 x weekly - 4-7 sec hold - 4 sec inhale - 8 sec exhale - 3 min duration    Patient Education  - Bowel Emptying Techniques    Charges:  PT EVAL: Moderate Complexity, 1NMR  In agreement with evaluation findings and clinical rationale, this evaluation involved MODERATE COMPLEXITY decision making due to 1-2 personal factors/comorbidities, 3 or more body structures involved/activity limitations, and evolving symptoms as documented in the evaluation.                                                                       PLAN OF CARE:      Goals: (to be met in 10 visits)    Not Met Progress Toward Partially Met Met   Patient will report consistent grade 4 on BSS for improved bowel health and ease with stool evacuation. [] [] [] []   Patient will demonstrate optimal voiding mechanics and breath regulation with bowel movements for improved bowel emptying without straining. [] [] [] []   Patient will report decreased urinary frequency to 6-8x/day indicating normalizing micturition reflex  for improved bladder health and function. [] [] [] []   Patient will demonstrate B hamstring & piriformis muscle length Min restricted or better to alleviate tension in support structures of pelvic floor musculature. [] [] [] []   Patient will demonstrate PFM lengthening WNL with coordinated diaphragmatic breathing x 10 reps to alleviate PFM pain and muscle tension for resolved dyspareunia. [] [] [] []   Patient will report adherence to HEP for continued exercise benefits following cessation of PT. [] [] [] []           Frequency / Duration: Patient will be seen 1-2x/week or a total of 10  visits over a 90 day period. Treatment will include: Manual Therapy; Neuromuscular Re-education; Self-Care Home Management; Therapeutic Activities; Ultrasound; Electrical stimulation (unattended); Home Exercise Program instruction; Therapeutic Exercise; Patient/Family Education    Education or treatment limitation: None   Rehab Potential: excellent    Patient/Family/Caregiver was advised of these findings, precautions, and treatment options and has agreed to actively participate in planning and for this course of care.    Thank you for your referral. Please co-sign or sign and return this letter via fax as soon as possible to [phone]. If you have any questions, please contact me at Dept: [phone]    Sincerely,  Electronically signed by therapist: Angela Astudillo, PT  Physician's certification required: Yes  I certify the need for these services furnished under this plan of treatment and while under my care.    X___________________________________________________ Date____________________    Certification From: 6/9/2025  To: 9/7/2025

## 2025-06-16 ENCOUNTER — OFFICE VISIT (OUTPATIENT)
Dept: PHYSICAL THERAPY | Age: 35
End: 2025-06-16
Attending: STUDENT IN AN ORGANIZED HEALTH CARE EDUCATION/TRAINING PROGRAM
Payer: COMMERCIAL

## 2025-06-16 PROCEDURE — 97110 THERAPEUTIC EXERCISES: CPT | Performed by: PHYSICAL THERAPIST

## 2025-06-16 NOTE — PROGRESS NOTES
Patient: Sarai Young (35 year old, female) Referring Provider:  Insurance:   Diagnosis: Prolapse of vaginal wall (N81.10) Fred Luo  [employer] [place]   Date of Surgery: No data recorded Next MD visit:  N/A   Precautions:  None none scheduled Referral Information:    Date of Evaluation: Req: 0, Auth: 0, Exp:     06/09/25 POC Auth Visits:  10        Today's Date   6/16/2025    Subjective  Pt reports no current questions. Is using the pillow to elevate. Currently feels heaviness.       Pain: 0/10     Objective  see flowsheet for details       Assessment  Emphasis on incorporating TX targeting external contributions to pt's symptoms: lumbar & hip mobility, hip strength, LE flexibility. Pt reported feeling reduced symptoms after supine hooklying hamstring stretch. Pt verbalized understanding of PT education re: recommended HEP parameters & will keep PT posted next visit.    Goals (to be met in 10 visits)      Not Met Progress Toward Partially Met Met   Patient will report consistent grade 4 on BSS for improved bowel health and ease with stool evacuation. [] [] [] []   Patient will demonstrate optimal voiding mechanics and breath regulation with bowel movements for improved bowel emptying without straining. [] [] [] []   Patient will report decreased urinary frequency to 6-8x/day indicating normalizing micturition reflex for improved bladder health and function. [] [] [] []   Patient will demonstrate B hamstring & piriformis muscle length Min restricted or better to alleviate tension in support structures of pelvic floor musculature. [] [] [] []   Patient will demonstrate PFM lengthening WNL with coordinated diaphragmatic breathing x 10 reps to alleviate PFM pain and muscle tension for resolved dyspareunia. [] [] [] []   Patient will report adherence to HEP for continued exercise benefits following cessation of PT. [] [] [] []             Plan  Review TA bracing w/ breathing coordination. Pt is aware PT will be out  of office from 6/18 & returning 6/23 & will contact central scheduling/ PFN direct line if any issues arise during this time. Next appointment confirmed.    Treatment Last 4 Visits  Treatment Day: 2       6/9/2025 6/16/2025   Pelvic Treatment   Therapeutic Exercise  NuStep L3 X 5'  Pt edu: HEP parameters (max of 20', focus on consistency vs duration)  Standing:  -flat back stretch at //bars 3 X 30\"  -hip add stretch at //bars 2 X 30\" R/L  Supine:  -hooklying hip Add jorge yellow ball 5\" X 15 (pt edu: can use folded pillow or rolled blanket for HEP)  -hooklying hip Abd jorge Blue TB 5\" X 15  -bridge X 15 (Blue TheraBand dispensed)  -LTR 5\" 2 X 10  -hooklying active HS stretch 10\" X 10 R/L   Neuro Re-Education Supine TA bracing w/ breathing coordination, w/ 1 pillow under hips for pelvic elevation gravity assist  Squatty potty    Education Discussed causes of constipation and strategies for mitigating symptoms including increased fiber, water, and exercise. Discussed use of squatty potty to optimize PFM relaxation and promote bowel emptying without straining. Discussed common contributing factors to pelvic organ prolapse and management of symptoms via PFM strengthening, as well as improved pressure management with functional lifting, bowel movements, etc. Discussed performance of PFM exercise with LEs/hip elevated to promote repositioning of pelvic organs.       Therapeutic Exercise Minutes  41   Neuro Re-Educ Minutes 10    Evaluation Minutes 35    Total Time Of Timed Procedures 10 41   Total Time Of Service-Based Procedures 35 0   Total Treatment Time 45 41   HEP Access Code: [email or web address]  URL: [email or web address]  Date: 06/09/2025  Prepared by: Angela Astudillo    Exercises  - Supine Transversus Abdominis Bracing - Hands on Stomach  - 1 x daily - 7 x weekly - 3 min duration  - Supine Diaphragmatic Breathing  - 1 x daily - 7 x weekly - 4-7 sec hold - 4 sec inhale - 8 sec exhale - 3 min duration    Patient  Education  - Bowel Emptying Techniques Access Code: [email or web address]  URL: [email or web address]  Date: 06/16/2025  Prepared by: Angela Astudillo    Exercises  - Hooklying Clamshell with Resistance  - 1 x daily - 4-5 x weekly - 1-2 sets - 15 reps - 5 sec hold  - Supine Hip Adduction Isometric with Ball  - 1 x daily - 4-5 x weekly - 1-2 sets - 15 reps - 5 sec hold  - Supine Bridge  - 1 x daily - 4-5 x weekly - 1-2 sets - 15 reps - 5 sec hold  - Supine Lower Trunk Rotation  - 1 x daily - 6-7 x weekly - 1 sets - 10 reps - 5 sec hold  - Standing 'L' Stretch at Counter  - 1 x daily - 6-7 x weekly - 3 sets - 30 sec hold  - Kneeling Adductor Stretch with Hip External Rotation  - 1 x daily - 6-7 x weekly - 2-3 sets - 30 sec hold  - Hooklying Hamstring Stretch  - 1 x daily - 6-7 x weekly - 1-2 sets - 5 reps - 10 sec hold        HEP  Access Code: [email or web address]  URL: [email or web address]  Date: 06/16/2025  Prepared by: Angela Astudillo    Exercises  - Hooklying Clamshell with Resistance  - 1 x daily - 4-5 x weekly - 1-2 sets - 15 reps - 5 sec hold  - Supine Hip Adduction Isometric with Ball  - 1 x daily - 4-5 x weekly - 1-2 sets - 15 reps - 5 sec hold  - Supine Bridge  - 1 x daily - 4-5 x weekly - 1-2 sets - 15 reps - 5 sec hold  - Supine Lower Trunk Rotation  - 1 x daily - 6-7 x weekly - 1 sets - 10 reps - 5 sec hold  - Standing 'L' Stretch at Counter  - 1 x daily - 6-7 x weekly - 3 sets - 30 sec hold  - Kneeling Adductor Stretch with Hip External Rotation  - 1 x daily - 6-7 x weekly - 2-3 sets - 30 sec hold  - Hooklying Hamstring Stretch  - 1 x daily - 6-7 x weekly - 1-2 sets - 5 reps - 10 sec hold    Charges  3TE

## 2025-06-23 ENCOUNTER — APPOINTMENT (OUTPATIENT)
Dept: PHYSICAL THERAPY | Age: 35
End: 2025-06-23
Attending: STUDENT IN AN ORGANIZED HEALTH CARE EDUCATION/TRAINING PROGRAM
Payer: COMMERCIAL

## 2025-06-30 ENCOUNTER — OFFICE VISIT (OUTPATIENT)
Dept: PHYSICAL THERAPY | Age: 35
End: 2025-06-30
Attending: STUDENT IN AN ORGANIZED HEALTH CARE EDUCATION/TRAINING PROGRAM
Payer: COMMERCIAL

## 2025-06-30 PROCEDURE — 97110 THERAPEUTIC EXERCISES: CPT | Performed by: PHYSICAL THERAPIST

## 2025-06-30 PROCEDURE — 97112 NEUROMUSCULAR REEDUCATION: CPT | Performed by: PHYSICAL THERAPIST

## 2025-06-30 PROCEDURE — 97140 MANUAL THERAPY 1/> REGIONS: CPT | Performed by: PHYSICAL THERAPIST

## 2025-06-30 NOTE — PROGRESS NOTES
Patient: Sarai Young (35 year old, female) Referring Provider:  Insurance:   Diagnosis: Prolapse of vaginal wall (N81.10) Fred Luo  [employer]   Date of Surgery: No data recorded Next MD visit:  N/A   Precautions:  None none scheduled Referral Information:    Date of Evaluation: Req: 0, Auth: 0, Exp:     06/09/25 POC Auth Visits:  10        Today's Date   6/30/2025    Subjective  Pt reports no questions/ concerns. Is feeling some rectal pressure additionally. Noting more w/ feeling of having BM. Able to complete HEP exercises, went well. Of note, having increased personal stress at the moment, is wondering if can affect pelvic floor; is having generalized widespread pain to other parts of her body neck & shlds.       Pain: 0/10    Objective  see flowsheet for details      Informed verbal consent for internal pelvic evaluation given: Yes; ongoing consent confirmed w/ regular check-ins throughout; pt declined option for second staff member present in the room    External Observation:   Voluntary contraction: present, including w/ breathing coordination  Voluntary relaxation: present  Involuntary contraction: present  Involuntary relaxation: present upon training, including w/ breathing coordination; though challenge w/ consistent coordination (some dyssynergy, required cueing for promotion of proper form)    Mons pubis: WNL  Labia majora: WNL  Labia minora: WNL  Urethral meatus: WNL  Introitus: WNL and other: constriction  Perineal body: WNL and other: constriction    Sensory/Reflex:  Vestibule: normal bilaterally    Internal Examination  Pelvic Floor Muscle strength: (PERF= Power/Endurance/Reps/Fast) MMT: 3/8/2/5  Accessory Muscle Use: gluteals, adductors    Tissue Laxity Test:  Anterior Wall: Mod  Posterior Wall: Min  Apical: Min    Internal Palpation: no significant TTP (brief temporary tenderness to R layer 2 PFM only) though varied min to mod generalized increased tension/ tone throughout B layer  1-3       Assessment  Benefit from examination of internal PFM to guide targeted PFM activities- pt educated in goals & mechanism & provided informed consent to proceed. Pt advised that she is always free to opt out at a future visit if wishes to remain external. Pt educated in findings of exam & how they affect POC. Pt verbalized understanding. Pt educated how increased stressors can potentially contribute to PFM muscle tension including constant \"holding\" or clenching & focusing on the breathing w/ PFM lengthening is a helpful technique to incorporate.      Goals (to be met in 10 visits)      Not Met Progress Toward Partially Met Met   Patient will report consistent grade 4 on BSS for improved bowel health and ease with stool evacuation. [] [] [] []   Patient will demonstrate optimal voiding mechanics and breath regulation with bowel movements for improved bowel emptying without straining. [] [] [] []   Patient will report decreased urinary frequency to 6-8x/day indicating normalizing micturition reflex for improved bladder health and function. [] [] [] []   Patient will demonstrate B hamstring & piriformis muscle length Min restricted or better to alleviate tension in support structures of pelvic floor musculature. [] [] [] []   Patient will demonstrate PFM lengthening WNL with coordinated diaphragmatic breathing x 10 reps to alleviate PFM pain and muscle tension for resolved dyspareunia. [] [] [] []   Patient will report adherence to HEP for continued exercise benefits following cessation of PT. [] [] [] []               Plan  Review TA bracing w/ breathing coordination; add PFM component for pelvic brace.    Treatment Last 4 Visits  Treatment Day: 3       6/9/2025 6/16/2025 6/30/2025   Pelvic Treatment   Therapeutic Exercise  NuStep L3 X 5'  Pt edu: HEP parameters (max of 20', focus on consistency vs duration)  Standing:  -flat back stretch at //bars 3 X 30\"  -hip add stretch at //bars 2 X 30\"  R/L  Supine:  -hooklying hip Add jorge yellow ball 5\" X 15 (pt edu: can use folded pillow or rolled blanket for HEP)  -hooklying hip Abd jorge Blue TB 5\" X 15  -bridge X 15 (Blue TheraBand dispensed)  -LTR 5\" 2 X 10  -hooklying active HS stretch 10\" X 10 R/L Standing:  -flat back stretch tableside 2 X 30\"  Supine:  -butterfly stretch 3 X 30\"  -happy baby stretch 3 X 30\"  Prone:  -child's pose stretch 3 X 30\"   Neuro Re-Education Supine TA bracing w/ breathing coordination, w/ 1 pillow under hips for pelvic elevation gravity assist  Squatty potty  PFM breathing coordination, w/ internal manual cueing (HEP)  Pt edu: stress connection on PFM: tension/ clenching   Manual Therapy   Internal PFM exam:  -Pt edu: Pt to inform PT if requesting break/ stop for any reason at any time. Explain mechanism of exam using pelvic model & clinical benefit/ purpose of exam. Advise pain levels should be controlled. Discuss findings with pt including connection to POC. Aftercare: potential of mild soreness.   Education Discussed causes of constipation and strategies for mitigating symptoms including increased fiber, water, and exercise. Discussed use of squatty potty to optimize PFM relaxation and promote bowel emptying without straining. Discussed common contributing factors to pelvic organ prolapse and management of symptoms via PFM strengthening, as well as improved pressure management with functional lifting, bowel movements, etc. Discussed performance of PFM exercise with LEs/hip elevated to promote repositioning of pelvic organs.        Therapeutic Exercise Minutes  41 15   Neuro Re-Educ Minutes 10  10   Manual Therapy Minutes   15   Evaluation Minutes 35     Total Time Of Timed Procedures 10 41 40   Total Time Of Service-Based Procedures 35 0 0   Total Treatment Time 45 41 40   HEP Access Code: [email or web address]  URL: [email or web address]  Date: 06/09/2025  Prepared by: Angela Astudillo    Exercises  - Supine Transversus  Abdominis Bracing - Hands on Stomach  - 1 x daily - 7 x weekly - 3 min duration  - Supine Diaphragmatic Breathing  - 1 x daily - 7 x weekly - 4-7 sec hold - 4 sec inhale - 8 sec exhale - 3 min duration    Patient Education  - Bowel Emptying Techniques Access Code: [email or web address]  URL: [email or web address]  Date: 06/16/2025  Prepared by: Angela Astudillo    Exercises  - Hooklying Clamshell with Resistance  - 1 x daily - 4-5 x weekly - 1-2 sets - 15 reps - 5 sec hold  - Supine Hip Adduction Isometric with Ball  - 1 x daily - 4-5 x weekly - 1-2 sets - 15 reps - 5 sec hold  - Supine Bridge  - 1 x daily - 4-5 x weekly - 1-2 sets - 15 reps - 5 sec hold  - Supine Lower Trunk Rotation  - 1 x daily - 6-7 x weekly - 1 sets - 10 reps - 5 sec hold  - Standing 'L' Stretch at Counter  - 1 x daily - 6-7 x weekly - 3 sets - 30 sec hold  - Kneeling Adductor Stretch with Hip External Rotation  - 1 x daily - 6-7 x weekly - 2-3 sets - 30 sec hold  - Hooklying Hamstring Stretch  - 1 x daily - 6-7 x weekly - 1-2 sets - 5 reps - 10 sec hold Access Code: [email or web address]  URL: [email or web address]  Date: 06/30/2025  Prepared by: Angela Astudillo    Exercises  - Hooklying Clamshell with Resistance  - 1 x daily - 4-5 x weekly - 1-2 sets - 15 reps - 5 sec hold  - Supine Hip Adduction Isometric with Ball  - 1 x daily - 4-5 x weekly - 1-2 sets - 15 reps - 5 sec hold  - Supine Bridge  - 1 x daily - 4-5 x weekly - 1-2 sets - 15 reps - 5 sec hold  - Supine Lower Trunk Rotation  - 1 x daily - 6-7 x weekly - 1 sets - 10 reps - 5 sec hold  - Standing 'L' Stretch at Counter  - 1 x daily - 6-7 x weekly - 3 sets - 30 sec hold  - Kneeling Adductor Stretch with Hip External Rotation  - 1 x daily - 6-7 x weekly - 2-3 sets - 30 sec hold  - Hooklying Hamstring Stretch  - 1 x daily - 6-7 x weekly - 1-2 sets - 5 reps - 10 sec hold  - Supine Butterfly Groin Stretch  - 1 x daily - 7 x weekly - 3 sets - 30 sec hold  - Supine Pelvic Floor Stretch  -  1 x daily - 7 x weekly - 3 sets - 30 sec hold  - Supine Diaphragmatic Breathing with Pelvic Floor Lengthening  - 1 x daily - 7 x weekly - 1 sets - 10 reps - 5 sec hold  - Supine Pelvic Floor Contraction  - 1 x daily - 7 x weekly - 1 sets - 10 reps - 5 sec hold  - Child's Pose Knees Apart and Hands Forward   - 1 x daily - 7 x weekly - 3 sets - 30 sec hold        HEP  Access Code: [email or web address]  URL: [email or web address]  Date: 06/30/2025  Prepared by: Angela Astudillo    Exercises  - Hooklying Clamshell with Resistance  - 1 x daily - 4-5 x weekly - 1-2 sets - 15 reps - 5 sec hold  - Supine Hip Adduction Isometric with Ball  - 1 x daily - 4-5 x weekly - 1-2 sets - 15 reps - 5 sec hold  - Supine Bridge  - 1 x daily - 4-5 x weekly - 1-2 sets - 15 reps - 5 sec hold  - Supine Lower Trunk Rotation  - 1 x daily - 6-7 x weekly - 1 sets - 10 reps - 5 sec hold  - Standing 'L' Stretch at Counter  - 1 x daily - 6-7 x weekly - 3 sets - 30 sec hold  - Kneeling Adductor Stretch with Hip External Rotation  - 1 x daily - 6-7 x weekly - 2-3 sets - 30 sec hold  - Hooklying Hamstring Stretch  - 1 x daily - 6-7 x weekly - 1-2 sets - 5 reps - 10 sec hold  - Supine Butterfly Groin Stretch  - 1 x daily - 7 x weekly - 3 sets - 30 sec hold  - Supine Pelvic Floor Stretch  - 1 x daily - 7 x weekly - 3 sets - 30 sec hold  - Supine Diaphragmatic Breathing with Pelvic Floor Lengthening  - 1 x daily - 7 x weekly - 1 sets - 10 reps - 5 sec hold  - Supine Pelvic Floor Contraction  - 1 x daily - 7 x weekly - 1 sets - 10 reps - 5 sec hold  - Child's Pose Knees Apart and Hands Forward   - 1 x daily - 7 x weekly - 3 sets - 30 sec hold    Charges  1TE, 1NMR, 1MT

## 2025-07-07 ENCOUNTER — OFFICE VISIT (OUTPATIENT)
Dept: PHYSICAL THERAPY | Age: 35
End: 2025-07-07
Attending: STUDENT IN AN ORGANIZED HEALTH CARE EDUCATION/TRAINING PROGRAM
Payer: COMMERCIAL

## 2025-07-07 PROCEDURE — 97110 THERAPEUTIC EXERCISES: CPT | Performed by: PHYSICAL THERAPIST

## 2025-07-07 PROCEDURE — 97112 NEUROMUSCULAR REEDUCATION: CPT | Performed by: PHYSICAL THERAPIST

## 2025-07-07 NOTE — PROGRESS NOTES
Patient: Sarai Young (35 year old, female) Referring Provider:  Insurance:   Diagnosis: Prolapse of vaginal wall (N81.10) Fred Luo  [employer]   Date of Surgery: No data recorded Next MD visit:  N/A   Precautions:  None none scheduled Referral Information:    Date of Evaluation: Req: 0, Auth: 0, Exp:     06/09/25 POC Auth Visits:  10        Today's Date   7/7/2025    Subjective  Pt has a question on the last two exercises on the kaylah.       Pain: 0/10     Objective  see flowsheet for details       Assessment  Cont w/ emphasis on PFM breathing coordination w/ use of elevation for gravity assist to reduce POP symptoms. Working repetition to master mechanics. Pt req manual cues to aid w/ stabilization of the R>L LE for sidelying hip abd raises w/ tendency to roll hips to use hip flexors; deferred adding this to HEP yet. Muscle fatigue from new strengthening added.    Goals (to be met in 10 visits)      Not Met Progress Toward Partially Met Met   Patient will report consistent grade 4 on BSS for improved bowel health and ease with stool evacuation. [] [] [] []   Patient will demonstrate optimal voiding mechanics and breath regulation with bowel movements for improved bowel emptying without straining. [] [] [] []   Patient will report decreased urinary frequency to 6-8x/day indicating normalizing micturition reflex for improved bladder health and function. [] [] [] []   Patient will demonstrate B hamstring & piriformis muscle length Min restricted or better to alleviate tension in support structures of pelvic floor musculature. [] [] [] []   Patient will demonstrate PFM lengthening WNL with coordinated diaphragmatic breathing x 10 reps to alleviate PFM pain and muscle tension for resolved dyspareunia. [] [] [] []   Patient will report adherence to HEP for continued exercise benefits following cessation of PT. [] [] [] []                 Plan  Review TA bracing w/ breathing coordination; add PFM component for  pelvic brace.    Treatment Last 4 Visits  Treatment Day: 4       6/9/2025 6/16/2025 6/30/2025 7/7/2025   Pelvic Treatment   Therapeutic Exercise  NuStep L3 X 5'  Pt edu: HEP parameters (max of 20', focus on consistency vs duration)  Standing:  -flat back stretch at //bars 3 X 30\"  -hip add stretch at //bars 2 X 30\" R/L  Supine:  -hooklying hip Add jorge yellow ball 5\" X 15 (pt edu: can use folded pillow or rolled blanket for HEP)  -hooklying hip Abd jorge Blue TB 5\" X 15  -bridge X 15 (Blue TheraBand dispensed)  -LTR 5\" 2 X 10  -hooklying active HS stretch 10\" X 10 R/L Standing:  -flat back stretch tableside 2 X 30\"  Supine:  -butterfly stretch 3 X 30\"  -happy baby stretch 3 X 30\"  Prone:  -child's pose stretch 3 X 30\" Pt edu: review HEP parameters max of 20' (even w/ adding in add'l ex's)  Prone:  -child's pose stretch 1 X 30\"  Quadruped:  -cat-cow X 12  S/L:  -hip Abd raise X 15 R/L w/ min A from PT for form  -clams X 15 R/L  -reverse clams X 15 R/L   Neuro Re-Education Supine TA bracing w/ breathing coordination, w/ 1 pillow under hips for pelvic elevation gravity assist  Squatty potty  PFM breathing coordination, w/ internal manual cueing (HEP)  Pt edu: stress connection on PFM: tension/ clenching Review PFM breathing coordination mechanics  -contract & relax/ lengthen  Supine:  -pelvic brace breathing coordination w/: BLE on SB X 3', add in PPT X 2', add in bridge X 2'  Advise pt utilize pelvic elevation position for gravity assist for POP support for HEP 5-15', 1-3x/ day prn; recommend later in day vs morning   Manual Therapy   Internal PFM exam:  -Pt edu: Pt to inform PT if requesting break/ stop for any reason at any time. Explain mechanism of exam using pelvic model & clinical benefit/ purpose of exam. Advise pain levels should be controlled. Discuss findings with pt including connection to POC. Aftercare: potential of mild soreness.    Education Discussed causes of constipation and strategies for mitigating  symptoms including increased fiber, water, and exercise. Discussed use of squatty potty to optimize PFM relaxation and promote bowel emptying without straining. Discussed common contributing factors to pelvic organ prolapse and management of symptoms via PFM strengthening, as well as improved pressure management with functional lifting, bowel movements, etc. Discussed performance of PFM exercise with LEs/hip elevated to promote repositioning of pelvic organs.         Therapeutic Exercise Minutes  41 15 23   Neuro Re-Educ Minutes 10  10 15   Manual Therapy Minutes   15    Evaluation Minutes 35      Total Time Of Timed Procedures 10 41 40 38   Total Time Of Service-Based Procedures 35 0 0 0   Total Treatment Time 45 41 40 38   HEP Access Code: [email or web address]  URL: [email or web address]  Date: 06/09/2025  Prepared by: Angela Astudillo    Exercises  - Supine Transversus Abdominis Bracing - Hands on Stomach  - 1 x daily - 7 x weekly - 3 min duration  - Supine Diaphragmatic Breathing  - 1 x daily - 7 x weekly - 4-7 sec hold - 4 sec inhale - 8 sec exhale - 3 min duration    Patient Education  - Bowel Emptying Techniques Access Code: [email or web address]  URL: [email or web address]  Date: 06/16/2025  Prepared by: Angela Astudillo    Exercises  - Hooklying Clamshell with Resistance  - 1 x daily - 4-5 x weekly - 1-2 sets - 15 reps - 5 sec hold  - Supine Hip Adduction Isometric with Ball  - 1 x daily - 4-5 x weekly - 1-2 sets - 15 reps - 5 sec hold  - Supine Bridge  - 1 x daily - 4-5 x weekly - 1-2 sets - 15 reps - 5 sec hold  - Supine Lower Trunk Rotation  - 1 x daily - 6-7 x weekly - 1 sets - 10 reps - 5 sec hold  - Standing 'L' Stretch at Counter  - 1 x daily - 6-7 x weekly - 3 sets - 30 sec hold  - Kneeling Adductor Stretch with Hip External Rotation  - 1 x daily - 6-7 x weekly - 2-3 sets - 30 sec hold  - Hooklying Hamstring Stretch  - 1 x daily - 6-7 x weekly - 1-2 sets - 5 reps - 10 sec hold Access Code:  [email or web address]  URL: [email or web address]  Date: 06/30/2025  Prepared by: Angela Astudillo    Exercises  - Hooklying Clamshell with Resistance  - 1 x daily - 4-5 x weekly - 1-2 sets - 15 reps - 5 sec hold  - Supine Hip Adduction Isometric with Ball  - 1 x daily - 4-5 x weekly - 1-2 sets - 15 reps - 5 sec hold  - Supine Bridge  - 1 x daily - 4-5 x weekly - 1-2 sets - 15 reps - 5 sec hold  - Supine Lower Trunk Rotation  - 1 x daily - 6-7 x weekly - 1 sets - 10 reps - 5 sec hold  - Standing 'L' Stretch at Counter  - 1 x daily - 6-7 x weekly - 3 sets - 30 sec hold  - Kneeling Adductor Stretch with Hip External Rotation  - 1 x daily - 6-7 x weekly - 2-3 sets - 30 sec hold  - Hooklying Hamstring Stretch  - 1 x daily - 6-7 x weekly - 1-2 sets - 5 reps - 10 sec hold  - Supine Butterfly Groin Stretch  - 1 x daily - 7 x weekly - 3 sets - 30 sec hold  - Supine Pelvic Floor Stretch  - 1 x daily - 7 x weekly - 3 sets - 30 sec hold  - Supine Diaphragmatic Breathing with Pelvic Floor Lengthening  - 1 x daily - 7 x weekly - 1 sets - 10 reps - 5 sec hold  - Supine Pelvic Floor Contraction  - 1 x daily - 7 x weekly - 1 sets - 10 reps - 5 sec hold  - Child's Pose Knees Apart and Hands Forward   - 1 x daily - 7 x weekly - 3 sets - 30 sec hold Access Code: [email or web address]  URL: [email or web address]  Date: 07/07/2025  Prepared by: Angela Astudillo    Exercises  - Hooklying Clamshell with Resistance  - 1 x daily - 4-5 x weekly - 1-2 sets - 15 reps - 5 sec hold  - Supine Hip Adduction Isometric with Ball  - 1 x daily - 4-5 x weekly - 1-2 sets - 15 reps - 5 sec hold  - Supine Bridge  - 1 x daily - 4-5 x weekly - 1-2 sets - 15 reps - 5 sec hold  - Supine Lower Trunk Rotation  - 1 x daily - 6-7 x weekly - 1 sets - 10 reps - 5 sec hold  - Standing 'L' Stretch at Counter  - 1 x daily - 6-7 x weekly - 3 sets - 30 sec hold  - Kneeling Adductor Stretch with Hip External Rotation  - 1 x daily - 6-7 x weekly - 2-3 sets - 30 sec  hold  - Hooklying Hamstring Stretch  - 1 x daily - 6-7 x weekly - 1-2 sets - 5 reps - 10 sec hold  - Supine Butterfly Groin Stretch  - 1 x daily - 7 x weekly - 3 sets - 30 sec hold  - Supine Pelvic Floor Stretch  - 1 x daily - 7 x weekly - 3 sets - 30 sec hold  - Supine Diaphragmatic Breathing with Pelvic Floor Lengthening  - 1 x daily - 7 x weekly - 1 sets - 10 reps - 5 sec hold  - Supine Pelvic Floor Contraction  - 1 x daily - 7 x weekly - 1 sets - 10 reps - 5 sec hold  - Child's Pose Knees Apart and Hands Forward   - 1 x daily - 7 x weekly - 3 sets - 30 sec hold  - Cat Cow  - 1 x daily - 7 x weekly - 2 sets - 10 reps - 5 sec hold  - Diaphragmatic Breathing with Hips Elevated (for Pelvic Organ Prolapse)  - 1 x daily - 7 x weekly - 3 min duration  - Pelvic Floor Muscle Contraction and Pelvic Tilt With Hips Elevated (for Pelvic Organ Prolapse)  - 1 x daily - 5 x weekly -  3 min duration  - Pelvic Floor Muscle Contraction and Pelvic Tilt to Bridge With Hips Elevated (for Pelvic Organ Prolapse)  - 1 x daily - 5 x weekly - 3 min duration  - Sidelying Reverse Clamshell  - 1 x daily - 5 x weekly - 1-2 sets - 15 reps  - Clamshell  - 1 x daily - 5 x weekly - 1-2 sets - 15 reps        HEP  Access Code: [email or web address]  URL: [email or web address]  Date: 07/07/2025  Prepared by: Angela Astudillo    Exercises  - Hooklying Clamshell with Resistance  - 1 x daily - 4-5 x weekly - 1-2 sets - 15 reps - 5 sec hold  - Supine Hip Adduction Isometric with Ball  - 1 x daily - 4-5 x weekly - 1-2 sets - 15 reps - 5 sec hold  - Supine Bridge  - 1 x daily - 4-5 x weekly - 1-2 sets - 15 reps - 5 sec hold  - Supine Lower Trunk Rotation  - 1 x daily - 6-7 x weekly - 1 sets - 10 reps - 5 sec hold  - Standing 'L' Stretch at Counter  - 1 x daily - 6-7 x weekly - 3 sets - 30 sec hold  - Kneeling Adductor Stretch with Hip External Rotation  - 1 x daily - 6-7 x weekly - 2-3 sets - 30 sec hold  - Hooklying Hamstring Stretch  - 1 x daily - 6-7 x  weekly - 1-2 sets - 5 reps - 10 sec hold  - Supine Butterfly Groin Stretch  - 1 x daily - 7 x weekly - 3 sets - 30 sec hold  - Supine Pelvic Floor Stretch  - 1 x daily - 7 x weekly - 3 sets - 30 sec hold  - Supine Diaphragmatic Breathing with Pelvic Floor Lengthening  - 1 x daily - 7 x weekly - 1 sets - 10 reps - 5 sec hold  - Supine Pelvic Floor Contraction  - 1 x daily - 7 x weekly - 1 sets - 10 reps - 5 sec hold  - Child's Pose Knees Apart and Hands Forward   - 1 x daily - 7 x weekly - 3 sets - 30 sec hold  - Cat Cow  - 1 x daily - 7 x weekly - 2 sets - 10 reps - 5 sec hold  - Diaphragmatic Breathing with Hips Elevated (for Pelvic Organ Prolapse)  - 1 x daily - 7 x weekly - 3 min duration  - Pelvic Floor Muscle Contraction and Pelvic Tilt With Hips Elevated (for Pelvic Organ Prolapse)  - 1 x daily - 5 x weekly -  3 min duration  - Pelvic Floor Muscle Contraction and Pelvic Tilt to Bridge With Hips Elevated (for Pelvic Organ Prolapse)  - 1 x daily - 5 x weekly - 3 min duration  - Sidelying Reverse Clamshell  - 1 x daily - 5 x weekly - 1-2 sets - 15 reps  - Clamshell  - 1 x daily - 5 x weekly - 1-2 sets - 15 reps    Charges  2TE, 1NMR

## 2025-07-14 ENCOUNTER — OFFICE VISIT (OUTPATIENT)
Dept: PHYSICAL THERAPY | Age: 35
End: 2025-07-14
Attending: STUDENT IN AN ORGANIZED HEALTH CARE EDUCATION/TRAINING PROGRAM
Payer: COMMERCIAL

## 2025-07-14 PROCEDURE — 97110 THERAPEUTIC EXERCISES: CPT | Performed by: PHYSICAL THERAPIST

## 2025-07-14 NOTE — PROGRESS NOTES
Patient: Sarai Young (35 year old, female) Referring Provider:  Insurance:   Diagnosis: Prolapse of vaginal wall (N81.10) Fred Luo  [place]   Date of Surgery: No data recorded Next MD visit:  N/A   Precautions:  None none scheduled Referral Information:    Date of Evaluation: Req: 0, Auth: 0, Exp:     06/09/25 POC Auth Visits:  10        Today's Date   7/14/2025    Subjective  Pt reports this week has period so has not done the exercises the first few days. Getting some pain to the hip/ leg w/ the leg lift.       Pain: 0/10     Objective  see flowsheet for details       Assessment  Progressed exercise in the clinic standing w/ sport cord bands to address posture component to symptoms. Pt req intermittent cueing to achieve & maintain proper form. Cont w/ varied lat hip soreness in visit during/ post ex's. Pt advised to keep PT posted to determine if any changes to TX req. This appears to be consistent w/ MSK soreness from incorporation of new strengthening. Reviewed new hip strengthening from HEP update last visit to confirm proper form; pt req cues to maintain pelvic stability. Cont w/ manual cues w/ sidelying hip abduction raise to avoid compensation.    Goals (to be met in 10 visits)      Not Met Progress Toward Partially Met Met   Patient will report consistent grade 4 on BSS for improved bowel health and ease with stool evacuation. [] [] [] []   Patient will demonstrate optimal voiding mechanics and breath regulation with bowel movements for improved bowel emptying without straining. [] [] [] []   Patient will report decreased urinary frequency to 6-8x/day indicating normalizing micturition reflex for improved bladder health and function. [] [] [] []   Patient will demonstrate B hamstring & piriformis muscle length Min restricted or better to alleviate tension in support structures of pelvic floor musculature. [] [] [] []   Patient will demonstrate PFM lengthening WNL with coordinated  diaphragmatic breathing x 10 reps to alleviate PFM pain and muscle tension for resolved dyspareunia. [] [] [] []   Patient will report adherence to HEP for continued exercise benefits following cessation of PT. [] [] [] []                   Plan  Review TA bracing w/ breathing coordination; add PFM component for pelvic brace.    Treatment Last 4 Visits  Treatment Day: 5 6/16/2025 6/30/2025 7/7/2025 7/14/2025   Pelvic Treatment   Therapeutic Exercise NuStep L3 X 5'  Pt edu: HEP parameters (max of 20', focus on consistency vs duration)  Standing:  -flat back stretch at //bars 3 X 30\"  -hip add stretch at //bars 2 X 30\" R/L  Supine:  -hooklying hip Add jorge yellow ball 5\" X 15 (pt edu: can use folded pillow or rolled blanket for HEP)  -hooklying hip Abd jorge Blue TB 5\" X 15  -bridge X 15 (Blue TheraBand dispensed)  -LTR 5\" 2 X 10  -hooklying active HS stretch 10\" X 10 R/L Standing:  -flat back stretch tableside 2 X 30\"  Supine:  -butterfly stretch 3 X 30\"  -happy baby stretch 3 X 30\"  Prone:  -child's pose stretch 3 X 30\" Pt edu: review HEP parameters max of 20' (even w/ adding in add'l ex's)  Prone:  -child's pose stretch 1 X 30\"  Quadruped:  -cat-cow X 12  S/L:  -hip Abd raise X 15 R/L w/ min A from PT for form  -clams X 15 R/L  -reverse clams X 15 R/L NuStep L4 X 6'  Standing:  -sport cord row X 20 Green  -sport cord shld ext X 20 Red  -sport cord lat walkouts X 10 R/L double Red  S/L:  -hip Abd raise X 15 R/L w/ min A from PT for form  -clams 2 X 10 R/L  -reverse clams 2 X 10 R/L   Neuro Re-Education  PFM breathing coordination, w/ internal manual cueing (HEP)  Pt edu: stress connection on PFM: tension/ clenching Review PFM breathing coordination mechanics  -contract & relax/ lengthen  Supine:  -pelvic brace breathing coordination w/: BLE on SB X 3', add in PPT X 2', add in bridge X 2'  Advise pt utilize pelvic elevation position for gravity assist for POP support for HEP 5-15', 1-3x/ day prn; recommend later  in day vs morning    Manual Therapy  Internal PFM exam:  -Pt edu: Pt to inform PT if requesting break/ stop for any reason at any time. Explain mechanism of exam using pelvic model & clinical benefit/ purpose of exam. Advise pain levels should be controlled. Discuss findings with pt including connection to POC. Aftercare: potential of mild soreness.     Therapeutic Exercise Minutes 41 15 23 40   Neuro Re-Educ Minutes  10 15    Manual Therapy Minutes  15     Total Time Of Timed Procedures 41 40 38 40   Total Time Of Service-Based Procedures 0 0 0 0   Total Treatment Time 41 40 38 40   HEP Access Code: [email or web address]  URL: [email or web address]  Date: 06/16/2025  Prepared by: Angela Astudillo    Exercises  - Hooklying Clamshell with Resistance  - 1 x daily - 4-5 x weekly - 1-2 sets - 15 reps - 5 sec hold  - Supine Hip Adduction Isometric with Ball  - 1 x daily - 4-5 x weekly - 1-2 sets - 15 reps - 5 sec hold  - Supine Bridge  - 1 x daily - 4-5 x weekly - 1-2 sets - 15 reps - 5 sec hold  - Supine Lower Trunk Rotation  - 1 x daily - 6-7 x weekly - 1 sets - 10 reps - 5 sec hold  - Standing 'L' Stretch at Counter  - 1 x daily - 6-7 x weekly - 3 sets - 30 sec hold  - Kneeling Adductor Stretch with Hip External Rotation  - 1 x daily - 6-7 x weekly - 2-3 sets - 30 sec hold  - Hooklying Hamstring Stretch  - 1 x daily - 6-7 x weekly - 1-2 sets - 5 reps - 10 sec hold Access Code: [email or web address]  URL: [email or web address]  Date: 06/30/2025  Prepared by: Angela Astudillo    Exercises  - Hooklying Clamshell with Resistance  - 1 x daily - 4-5 x weekly - 1-2 sets - 15 reps - 5 sec hold  - Supine Hip Adduction Isometric with Ball  - 1 x daily - 4-5 x weekly - 1-2 sets - 15 reps - 5 sec hold  - Supine Bridge  - 1 x daily - 4-5 x weekly - 1-2 sets - 15 reps - 5 sec hold  - Supine Lower Trunk Rotation  - 1 x daily - 6-7 x weekly - 1 sets - 10 reps - 5 sec hold  - Standing 'L' Stretch at Counter  - 1 x daily - 6-7 x weekly  - 3 sets - 30 sec hold  - Kneeling Adductor Stretch with Hip External Rotation  - 1 x daily - 6-7 x weekly - 2-3 sets - 30 sec hold  - Hooklying Hamstring Stretch  - 1 x daily - 6-7 x weekly - 1-2 sets - 5 reps - 10 sec hold  - Supine Butterfly Groin Stretch  - 1 x daily - 7 x weekly - 3 sets - 30 sec hold  - Supine Pelvic Floor Stretch  - 1 x daily - 7 x weekly - 3 sets - 30 sec hold  - Supine Diaphragmatic Breathing with Pelvic Floor Lengthening  - 1 x daily - 7 x weekly - 1 sets - 10 reps - 5 sec hold  - Supine Pelvic Floor Contraction  - 1 x daily - 7 x weekly - 1 sets - 10 reps - 5 sec hold  - Child's Pose Knees Apart and Hands Forward   - 1 x daily - 7 x weekly - 3 sets - 30 sec hold Access Code: [email or web address]  URL: [email or web address]  Date: 07/07/2025  Prepared by: Angela Astudillo    Exercises  - Hooklying Clamshell with Resistance  - 1 x daily - 4-5 x weekly - 1-2 sets - 15 reps - 5 sec hold  - Supine Hip Adduction Isometric with Ball  - 1 x daily - 4-5 x weekly - 1-2 sets - 15 reps - 5 sec hold  - Supine Bridge  - 1 x daily - 4-5 x weekly - 1-2 sets - 15 reps - 5 sec hold  - Supine Lower Trunk Rotation  - 1 x daily - 6-7 x weekly - 1 sets - 10 reps - 5 sec hold  - Standing 'L' Stretch at Counter  - 1 x daily - 6-7 x weekly - 3 sets - 30 sec hold  - Kneeling Adductor Stretch with Hip External Rotation  - 1 x daily - 6-7 x weekly - 2-3 sets - 30 sec hold  - Hooklying Hamstring Stretch  - 1 x daily - 6-7 x weekly - 1-2 sets - 5 reps - 10 sec hold  - Supine Butterfly Groin Stretch  - 1 x daily - 7 x weekly - 3 sets - 30 sec hold  - Supine Pelvic Floor Stretch  - 1 x daily - 7 x weekly - 3 sets - 30 sec hold  - Supine Diaphragmatic Breathing with Pelvic Floor Lengthening  - 1 x daily - 7 x weekly - 1 sets - 10 reps - 5 sec hold  - Supine Pelvic Floor Contraction  - 1 x daily - 7 x weekly - 1 sets - 10 reps - 5 sec hold  - Child's Pose Knees Apart and Hands Forward   - 1 x daily - 7 x weekly - 3 sets  - 30 sec hold  - Cat Cow  - 1 x daily - 7 x weekly - 2 sets - 10 reps - 5 sec hold  - Diaphragmatic Breathing with Hips Elevated (for Pelvic Organ Prolapse)  - 1 x daily - 7 x weekly - 3 min duration  - Pelvic Floor Muscle Contraction and Pelvic Tilt With Hips Elevated (for Pelvic Organ Prolapse)  - 1 x daily - 5 x weekly -  3 min duration  - Pelvic Floor Muscle Contraction and Pelvic Tilt to Bridge With Hips Elevated (for Pelvic Organ Prolapse)  - 1 x daily - 5 x weekly - 3 min duration  - Sidelying Reverse Clamshell  - 1 x daily - 5 x weekly - 1-2 sets - 15 reps  - Clamshell  - 1 x daily - 5 x weekly - 1-2 sets - 15 reps         HEP  Access Code: [email or web address]  URL: [email or web address]  Date: 07/07/2025  Prepared by: Angela Astudillo    Exercises  - Hooklying Clamshell with Resistance  - 1 x daily - 4-5 x weekly - 1-2 sets - 15 reps - 5 sec hold  - Supine Hip Adduction Isometric with Ball  - 1 x daily - 4-5 x weekly - 1-2 sets - 15 reps - 5 sec hold  - Supine Bridge  - 1 x daily - 4-5 x weekly - 1-2 sets - 15 reps - 5 sec hold  - Supine Lower Trunk Rotation  - 1 x daily - 6-7 x weekly - 1 sets - 10 reps - 5 sec hold  - Standing 'L' Stretch at Counter  - 1 x daily - 6-7 x weekly - 3 sets - 30 sec hold  - Kneeling Adductor Stretch with Hip External Rotation  - 1 x daily - 6-7 x weekly - 2-3 sets - 30 sec hold  - Hooklying Hamstring Stretch  - 1 x daily - 6-7 x weekly - 1-2 sets - 5 reps - 10 sec hold  - Supine Butterfly Groin Stretch  - 1 x daily - 7 x weekly - 3 sets - 30 sec hold  - Supine Pelvic Floor Stretch  - 1 x daily - 7 x weekly - 3 sets - 30 sec hold  - Supine Diaphragmatic Breathing with Pelvic Floor Lengthening  - 1 x daily - 7 x weekly - 1 sets - 10 reps - 5 sec hold  - Supine Pelvic Floor Contraction  - 1 x daily - 7 x weekly - 1 sets - 10 reps - 5 sec hold  - Child's Pose Knees Apart and Hands Forward   - 1 x daily - 7 x weekly - 3 sets - 30 sec hold  - Cat Cow  - 1 x daily - 7 x weekly - 2  sets - 10 reps - 5 sec hold  - Diaphragmatic Breathing with Hips Elevated (for Pelvic Organ Prolapse)  - 1 x daily - 7 x weekly - 3 min duration  - Pelvic Floor Muscle Contraction and Pelvic Tilt With Hips Elevated (for Pelvic Organ Prolapse)  - 1 x daily - 5 x weekly -  3 min duration  - Pelvic Floor Muscle Contraction and Pelvic Tilt to Bridge With Hips Elevated (for Pelvic Organ Prolapse)  - 1 x daily - 5 x weekly - 3 min duration  - Sidelying Reverse Clamshell  - 1 x daily - 5 x weekly - 1-2 sets - 15 reps  - Clamshell  - 1 x daily - 5 x weekly - 1-2 sets - 15 reps    Charges  3TE

## 2025-07-21 ENCOUNTER — OFFICE VISIT (OUTPATIENT)
Dept: PHYSICAL THERAPY | Age: 35
End: 2025-07-21
Attending: STUDENT IN AN ORGANIZED HEALTH CARE EDUCATION/TRAINING PROGRAM
Payer: COMMERCIAL

## 2025-07-21 PROCEDURE — 97110 THERAPEUTIC EXERCISES: CPT | Performed by: PHYSICAL THERAPIST

## 2025-07-21 PROCEDURE — 97112 NEUROMUSCULAR REEDUCATION: CPT | Performed by: PHYSICAL THERAPIST

## 2025-07-21 NOTE — PROGRESS NOTES
Patient: Saria Young (35 year old, female) Referring Provider:  Insurance:   Diagnosis: Prolapse of vaginal wall (N81.10) Fred Luo  [place]   Date of Surgery: No data recorded Next MD visit:  N/A   Precautions:  None none scheduled Referral Information:    Date of Evaluation: Req: 0, Auth: 0, Exp:     06/09/25 POC Auth Visits:  10        Today's Date   7/21/2025    Subjective  Pt reports no current pain, the previous pain to hip/ leg is better       Pain: 0/10     Objective  see flowsheet for details       Assessment  Pt challenged w/ efficient coordination of all components of pelvic brace w/ proper breathing mechanics, especially w/ addition of proximal hip strengthening activation concurrently. Pt will benefit from review of this to ensure mastery for proper pelvic support & bladder/ bowel health. Pt had improved form w/ sidelying hip abduction raise compared to prior visit. Will benefit from review though is able to add to HEP today.     Goals (to be met in 10 visits)      Not Met Progress Toward Partially Met Met   Patient will report consistent grade 4 on BSS for improved bowel health and ease with stool evacuation. [] [] [] []   Patient will demonstrate optimal voiding mechanics and breath regulation with bowel movements for improved bowel emptying without straining. [] [] [] []   Patient will report decreased urinary frequency to 6-8x/day indicating normalizing micturition reflex for improved bladder health and function. [] [] [] []   Patient will demonstrate B hamstring & piriformis muscle length Min restricted or better to alleviate tension in support structures of pelvic floor musculature. [] [] [] []   Patient will demonstrate PFM lengthening WNL with coordinated diaphragmatic breathing x 10 reps to alleviate PFM pain and muscle tension for resolved dyspareunia. [] [] [] []   Patient will report adherence to HEP for continued exercise benefits following cessation of PT. [] [] [] []                      Plan  Next: may add sport cord activities for HEP     Treatment Last 4 Visits  Treatment Day: 6       6/30/2025 7/7/2025 7/14/2025 7/21/2025   Pelvic Treatment   Therapeutic Exercise Standing:  -flat back stretch tableside 2 X 30\"  Supine:  -butterfly stretch 3 X 30\"  -happy baby stretch 3 X 30\"  Prone:  -child's pose stretch 3 X 30\" Pt edu: review HEP parameters max of 20' (even w/ adding in add'l ex's)  Prone:  -child's pose stretch 1 X 30\"  Quadruped:  -cat-cow X 12  S/L:  -hip Abd raise X 15 R/L w/ min A from PT for form  -clams X 15 R/L  -reverse clams X 15 R/L NuStep L4 X 6'  Standing:  -sport cord row X 20 Green  -sport cord shld ext X 20 Red  -sport cord lat walkouts X 10 R/L double Red  S/L:  -hip Abd raise X 15 R/L w/ min A from PT for form  -clams 2 X 10 R/L  -reverse clams 2 X 10 R/L Standing:  -sport cord row X 20 Green  -sport cord shld ext X 20 Red  S/L:  -hip Abd raise X 15 R/L w/ min A from PT for initial form set up   Neuro Re-Education PFM breathing coordination, w/ internal manual cueing (HEP)  Pt edu: stress connection on PFM: tension/ clenching Review PFM breathing coordination mechanics  -contract & relax/ lengthen  Supine:  -pelvic brace breathing coordination w/: BLE on SB X 3', add in PPT X 2', add in bridge X 2'  Advise pt utilize pelvic elevation position for gravity assist for POP support for HEP 5-15', 1-3x/ day prn; recommend later in day vs morning  Supine:  -hooklying TA bracing breathing coordination X 2'  -instruction in pelvic brace: PFM & TA bracing synergistic contraction; hooklying pelvic brace breathing coordination X 2'  -->handout provided including pelvic brace w/ ADLs  -pelvic brace breathing coordination w/: bridge w/ hip Add jorge yellow ball X 2', bridge w/ hip Abd jorge black pilates ring X 2'  Seated:  -pelvic brace breathing coordination w/: 2 folded towels externally placed at perineum for self cueing/ feedback, w/ use of brown vandana ball for visual  movement simulation, X 1'   Manual Therapy Internal PFM exam:  -Pt edu: Pt to inform PT if requesting break/ stop for any reason at any time. Explain mechanism of exam using pelvic model & clinical benefit/ purpose of exam. Advise pain levels should be controlled. Discuss findings with pt including connection to POC. Aftercare: potential of mild soreness.      Education    Pt to check w/ PCP re: any prebiotic/ probiotics, any supplements such as turmeric   Therapeutic Exercise Minutes 15 23 40 16   Neuro Re-Educ Minutes 10 15  24   Manual Therapy Minutes 15      Other Timed Activities Minutes    3   Total Time Of Timed Procedures 40 38 40 43   Total Time Of Service-Based Procedures 0 0 0 0   Total Treatment Time 40 38 40 43   HEP Access Code: [email or web address]  URL: [email or web address]  Date: 06/30/2025  Prepared by: Angela Astudillo    Exercises  - Hooklying Clamshell with Resistance  - 1 x daily - 4-5 x weekly - 1-2 sets - 15 reps - 5 sec hold  - Supine Hip Adduction Isometric with Ball  - 1 x daily - 4-5 x weekly - 1-2 sets - 15 reps - 5 sec hold  - Supine Bridge  - 1 x daily - 4-5 x weekly - 1-2 sets - 15 reps - 5 sec hold  - Supine Lower Trunk Rotation  - 1 x daily - 6-7 x weekly - 1 sets - 10 reps - 5 sec hold  - Standing 'L' Stretch at Counter  - 1 x daily - 6-7 x weekly - 3 sets - 30 sec hold  - Kneeling Adductor Stretch with Hip External Rotation  - 1 x daily - 6-7 x weekly - 2-3 sets - 30 sec hold  - Hooklying Hamstring Stretch  - 1 x daily - 6-7 x weekly - 1-2 sets - 5 reps - 10 sec hold  - Supine Butterfly Groin Stretch  - 1 x daily - 7 x weekly - 3 sets - 30 sec hold  - Supine Pelvic Floor Stretch  - 1 x daily - 7 x weekly - 3 sets - 30 sec hold  - Supine Diaphragmatic Breathing with Pelvic Floor Lengthening  - 1 x daily - 7 x weekly - 1 sets - 10 reps - 5 sec hold  - Supine Pelvic Floor Contraction  - 1 x daily - 7 x weekly - 1 sets - 10 reps - 5 sec hold  - Child's Pose Knees Apart and Hands  Forward   - 1 x daily - 7 x weekly - 3 sets - 30 sec hold Access Code: [email or web address]  URL: [email or web address]  Date: 07/07/2025  Prepared by: Angela Astudillo    Exercises  - Hooklying Clamshell with Resistance  - 1 x daily - 4-5 x weekly - 1-2 sets - 15 reps - 5 sec hold  - Supine Hip Adduction Isometric with Ball  - 1 x daily - 4-5 x weekly - 1-2 sets - 15 reps - 5 sec hold  - Supine Bridge  - 1 x daily - 4-5 x weekly - 1-2 sets - 15 reps - 5 sec hold  - Supine Lower Trunk Rotation  - 1 x daily - 6-7 x weekly - 1 sets - 10 reps - 5 sec hold  - Standing 'L' Stretch at Counter  - 1 x daily - 6-7 x weekly - 3 sets - 30 sec hold  - Kneeling Adductor Stretch with Hip External Rotation  - 1 x daily - 6-7 x weekly - 2-3 sets - 30 sec hold  - Hooklying Hamstring Stretch  - 1 x daily - 6-7 x weekly - 1-2 sets - 5 reps - 10 sec hold  - Supine Butterfly Groin Stretch  - 1 x daily - 7 x weekly - 3 sets - 30 sec hold  - Supine Pelvic Floor Stretch  - 1 x daily - 7 x weekly - 3 sets - 30 sec hold  - Supine Diaphragmatic Breathing with Pelvic Floor Lengthening  - 1 x daily - 7 x weekly - 1 sets - 10 reps - 5 sec hold  - Supine Pelvic Floor Contraction  - 1 x daily - 7 x weekly - 1 sets - 10 reps - 5 sec hold  - Child's Pose Knees Apart and Hands Forward   - 1 x daily - 7 x weekly - 3 sets - 30 sec hold  - Cat Cow  - 1 x daily - 7 x weekly - 2 sets - 10 reps - 5 sec hold  - Diaphragmatic Breathing with Hips Elevated (for Pelvic Organ Prolapse)  - 1 x daily - 7 x weekly - 3 min duration  - Pelvic Floor Muscle Contraction and Pelvic Tilt With Hips Elevated (for Pelvic Organ Prolapse)  - 1 x daily - 5 x weekly -  3 min duration  - Pelvic Floor Muscle Contraction and Pelvic Tilt to Bridge With Hips Elevated (for Pelvic Organ Prolapse)  - 1 x daily - 5 x weekly - 3 min duration  - Sidelying Reverse Clamshell  - 1 x daily - 5 x weekly - 1-2 sets - 15 reps  - Clamshell  - 1 x daily - 5 x weekly - 1-2 sets - 15 reps  Pelvic  brace, pelvic brace w/ ADLs    Access Code: [email or web address]  URL: [email or web address]  Date: 07/21/2025  Prepared by: Angela Astudillo    Exercises  - Hooklying Clamshell with Resistance  - 1 x daily - 4-5 x weekly - 1-2 sets - 15 reps - 5 sec hold  - Supine Hip Adduction Isometric with Ball  - 1 x daily - 4-5 x weekly - 1-2 sets - 15 reps - 5 sec hold  - Supine Bridge  - 1 x daily - 4-5 x weekly - 1-2 sets - 15 reps - 5 sec hold  - Supine Lower Trunk Rotation  - 1 x daily - 6-7 x weekly - 1 sets - 10 reps - 5 sec hold  - Standing 'L' Stretch at Counter  - 1 x daily - 6-7 x weekly - 3 sets - 30 sec hold  - Kneeling Adductor Stretch with Hip External Rotation  - 1 x daily - 6-7 x weekly - 2-3 sets - 30 sec hold  - Hooklying Hamstring Stretch  - 1 x daily - 6-7 x weekly - 1-2 sets - 5 reps - 10 sec hold  - Supine Butterfly Groin Stretch  - 1 x daily - 7 x weekly - 3 sets - 30 sec hold  - Supine Pelvic Floor Stretch  - 1 x daily - 7 x weekly - 3 sets - 30 sec hold  - Supine Diaphragmatic Breathing with Pelvic Floor Lengthening  - 1 x daily - 7 x weekly - 1 sets - 10 reps - 5 sec hold  - Supine Pelvic Floor Contraction  - 1 x daily - 7 x weekly - 1 sets - 10 reps - 5 sec hold  - Child's Pose Knees Apart and Hands Forward   - 1 x daily - 7 x weekly - 3 sets - 30 sec hold  - Cat Cow  - 1 x daily - 7 x weekly - 2 sets - 10 reps - 5 sec hold  - Diaphragmatic Breathing with Hips Elevated (for Pelvic Organ Prolapse)  - 1 x daily - 7 x weekly - 3 min duration  - Pelvic Floor Muscle Contraction and Pelvic Tilt With Hips Elevated (for Pelvic Organ Prolapse)  - 1 x daily - 5 x weekly -  3 min duration  - Pelvic Floor Muscle Contraction and Pelvic Tilt to Bridge With Hips Elevated (for Pelvic Organ Prolapse)  - 1 x daily - 5 x weekly - 3 min duration  - Sidelying Reverse Clamshell  - 1 x daily - 5 x weekly - 1-2 sets - 15 reps  - Clamshell  - 1 x daily - 5 x weekly - 1-2 sets - 15 reps  - Supine Transversus Abdominis  Bracing with Pelvic Floor Contraction  - 1 x daily - 5 x weekly - 2-3 min duration  - Sidelying Hip Abduction  - 1 x daily - 5 x weekly - 1-2 sets - 10 reps  - Seated Pelvic Floor Breathing Coordination  - 1 x daily - 5 x weekly - 1 sets - 10 reps        HEP  Pelvic brace, pelvic brace w/ ADLs    Access Code: [email or web address]  URL: [email or web address]  Date: 07/21/2025  Prepared by: Angela Astudillo    Exercises  - Hooklying Clamshell with Resistance  - 1 x daily - 4-5 x weekly - 1-2 sets - 15 reps - 5 sec hold  - Supine Hip Adduction Isometric with Ball  - 1 x daily - 4-5 x weekly - 1-2 sets - 15 reps - 5 sec hold  - Supine Bridge  - 1 x daily - 4-5 x weekly - 1-2 sets - 15 reps - 5 sec hold  - Supine Lower Trunk Rotation  - 1 x daily - 6-7 x weekly - 1 sets - 10 reps - 5 sec hold  - Standing 'L' Stretch at Counter  - 1 x daily - 6-7 x weekly - 3 sets - 30 sec hold  - Kneeling Adductor Stretch with Hip External Rotation  - 1 x daily - 6-7 x weekly - 2-3 sets - 30 sec hold  - Hooklying Hamstring Stretch  - 1 x daily - 6-7 x weekly - 1-2 sets - 5 reps - 10 sec hold  - Supine Butterfly Groin Stretch  - 1 x daily - 7 x weekly - 3 sets - 30 sec hold  - Supine Pelvic Floor Stretch  - 1 x daily - 7 x weekly - 3 sets - 30 sec hold  - Supine Diaphragmatic Breathing with Pelvic Floor Lengthening  - 1 x daily - 7 x weekly - 1 sets - 10 reps - 5 sec hold  - Supine Pelvic Floor Contraction  - 1 x daily - 7 x weekly - 1 sets - 10 reps - 5 sec hold  - Child's Pose Knees Apart and Hands Forward   - 1 x daily - 7 x weekly - 3 sets - 30 sec hold  - Cat Cow  - 1 x daily - 7 x weekly - 2 sets - 10 reps - 5 sec hold  - Diaphragmatic Breathing with Hips Elevated (for Pelvic Organ Prolapse)  - 1 x daily - 7 x weekly - 3 min duration  - Pelvic Floor Muscle Contraction and Pelvic Tilt With Hips Elevated (for Pelvic Organ Prolapse)  - 1 x daily - 5 x weekly -  3 min duration  - Pelvic Floor Muscle Contraction and Pelvic Tilt to  Bridge With Hips Elevated (for Pelvic Organ Prolapse)  - 1 x daily - 5 x weekly - 3 min duration  - Sidelying Reverse Clamshell  - 1 x daily - 5 x weekly - 1-2 sets - 15 reps  - Clamshell  - 1 x daily - 5 x weekly - 1-2 sets - 15 reps  - Supine Transversus Abdominis Bracing with Pelvic Floor Contraction  - 1 x daily - 5 x weekly - 2-3 min duration  - Sidelying Hip Abduction  - 1 x daily - 5 x weekly - 1-2 sets - 10 reps  - Seated Pelvic Floor Breathing Coordination  - 1 x daily - 5 x weekly - 1 sets - 10 reps    Charges  2NMR, 1TE

## 2025-07-21 NOTE — PATIENT INSTRUCTIONS
THE PELVIC BRACE    The pelvic brace is a combined pelvic floor and transverse abdominal low intensity muscle contraction. The transverse abdominal muscle is the deepest layer of the abdominal muscles and it aids in supporting the pelvic organs, spine and pelvis. Contraction of the transverse abdominal muscle creates a mild intensity tension of the lateral lower abdominal wall. Regular exercise of these muscles can build strength, awareness and retrain the muscles how to function together.     Correctly using and coordinating your pelvic floor and abdominal muscles can be the key to controlling your incontinence problem. The pelvic brace exercise creates an internal girdle to support your bladder and pelvic organs. First learn to perform the pelvic brace correctly, and then use the brace with physical activities that put excessive pressure on the pelvic organs and pelvic floor muscles.     POSTURAL MUSCLES  Many muscles in our body function all day long to keep us upright against gravity. We rarely notice that these muscles are even working as they perform at very low intensity. The transverse abdominal and pelvic floor muscles are included in this group of muscles along with muscles such as your back and neck muscles. When one of the muscles get weak it requires retraining to get it working again.     GUIDELINES TO PERFORMING THE PELVIC BRACE  The pelvic brace contraction creates a feeling of deep tension and drawing in of the lower abdomen and pelvic floor.   Your spine (low back) should be in neutral position.  Your therapist will help you find your neutral position.  Always perform the exercise as instructed by your therapist.   The correct contraction creates a hollowing of the lower belly to the bikini line.  Do not strain, bear down or bulge your abdomen as you do the exercise.    PERFORMING THE PELVIC BRACE  Place your fingers on your lower abdomen just inside your pelvic bones. You should feel the low  level contraction under your fingertips.  Contract the pelvic floor creating tension in the surface layer muscles that moves up to you abdomen and stops at the bikini line. This draws in and flattens the lower and side muscles of your abdomen.  Imagine drawing the vagina or scrotum into the body.   Tighten both the muscles as if you are trying to zip up a pair of pants to the bikini line.   Breathe while you maintain the low level contraction.  Remember, do not bulge your belly, strain or allow movement in the pelvis or back.  If you feel the quality of your exercise decline by starting to strain or bulge the abdominal muscles, stop your exercise session.  © 2004, [employer],          THE PELVIC BRACE WITH DAILY ACTIVITIES  Try the following daily activities in lying, sitting and standing positions.    The pelvic brace and cough  Breathe in, as you prepare to cough, bring your hand to your mouth and do the pelvic brace.   Hold the muscle and cough.  Now relax the brace.   If coughing causes leakage try this activity while clearing your throat.  Repeat ___ times    The pelvic brace and sit to stand  Breathe in as you prepare to stand   Do the pelvic brace.  Hold the muscles and stand up.   Be sure your therapist has shown you the proper technique.  Repeat ___ times.     The pelvic brace and lifting  Place a lightweight object of __lbs. on a table or the floor.  Place your feet shoulder width apart and keep your back straight.   Perform the pelvic brace, bend your knees to reach the object and lift.     Repeat ___ times.    Walking  Stand straight and upright with a neutral spine.  Do the pelvic brace while you walk during the day or practice walking in place for ____ minutes.    Other  activities:  ______________________________________________________________________    ______________________________________________________________________    ______________________________________________________________________      © 2004, [employer]

## 2025-07-28 ENCOUNTER — OFFICE VISIT (OUTPATIENT)
Dept: PHYSICAL THERAPY | Age: 35
End: 2025-07-28
Attending: STUDENT IN AN ORGANIZED HEALTH CARE EDUCATION/TRAINING PROGRAM
Payer: COMMERCIAL

## 2025-07-28 PROCEDURE — 97110 THERAPEUTIC EXERCISES: CPT | Performed by: PHYSICAL THERAPIST

## 2025-07-28 PROCEDURE — 97112 NEUROMUSCULAR REEDUCATION: CPT | Performed by: PHYSICAL THERAPIST

## 2025-07-28 NOTE — PROGRESS NOTES
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 No recent pelvic symptoms in the last week. Getting better. No current pain.       Pain: 0/10     Objective  see flowsheet for details       Assessment  Improved mechanics with pelvic brace breathing coordination overall noted today. Pt req cues with sport cord rows & shld ext to achieve & maintain proper form: will hold off adding this to HEP yet.    Goals (to be met in 10 visits)      Not Met Progress Toward Partially Met Met   Patient will report consistent grade 4 on BSS for improved bowel health and ease with stool evacuation. [] [] [] []   Patient will demonstrate optimal voiding mechanics and breath regulation with bowel movements for improved bowel emptying without straining. [] [] [] []   Patient will report decreased urinary frequency to 6-8x/day indicating normalizing micturition reflex for improved bladder health and function. [] [] [] []   Patient will demonstrate B hamstring & piriformis muscle length Min restricted or better to alleviate tension in support structures of pelvic floor musculature. [] [] [] []   Patient will demonstrate PFM lengthening WNL with coordinated diaphragmatic breathing x 10 reps to alleviate PFM pain and muscle tension for resolved dyspareunia. [] [] [] []   Patient will report adherence to HEP for continued exercise benefits following cessation of PT. [] [] [] []                       Plan  Review sport cord activities to consider fo HEP.    Treatment Last 4 Visits  Treatment Day: 7       7/7/2025 7/14/2025 7/21/2025 7/28/2025    Pelvic Treatment   Therapeutic Exercise Pt edu: review HEP parameters max of 20' (even w/ adding in add'l ex's)  Prone:  -child's pose stretch 1 X 30\"  Quadruped:  -cat-cow X 12  S/L:  -hip Abd raise X 15 R/L w/ min A from PT for form  -clams X 15 R/L  -reverse clams X 15 R/L NuStep L4 X 6'  Standing:  -sport cord row X 20 Green  -sport cord shld ext X 20 Red  -sport cord lat walkouts X 10 R/L double Red  S/L:  -hip Abd raise X 15 R/L w/ min A from PT for form  -clams 2 X 10 R/L  -reverse clams 2 X 10 R/L Standing:  -sport cord row X 20 Green  -sport cord shld ext X 20 Red  S/L:  -hip Abd raise X 15 R/L w/ min A from PT for initial form set up NuStep L5 X 6'  Standing:  -sport cord row X 20 Green  -sport cord shld ext X 20 Red  -sport cord lat walkouts X 10 R/L double Red  -sport cord paloff press X 15 R/L double Red  -sport cord stir the pot X 10 CW/ CCW R/L double Red   Neuro Re-Education Review PFM breathing coordination mechanics  -contract & relax/ lengthen  Supine:  -pelvic brace breathing coordination w/: BLE on SB X 3', add in PPT X 2', add in bridge X 2'  Advise pt utilize pelvic elevation position for gravity assist for POP support for HEP 5-15', 1-3x/ day prn; recommend later in day vs morning  Supine:  -hooklying TA bracing breathing coordination X 2'  -instruction in pelvic brace: PFM & TA bracing synergistic contraction; hooklying pelvic brace breathing coordination X 2'  -->handout provided including pelvic brace w/ ADLs  -pelvic brace breathing coordination w/: bridge w/ hip Add jorge yellow ball X 2', bridge w/ hip Abd jorge black pilates ring X 2'  Seated:  -pelvic brace breathing coordination w/: 2 folded towels externally placed at perineum for self cueing/ feedback, w/ use of brown vandana ball for visual movement simulation, X 1' Pelvic brace breathing coordination:  -Shuttle: 25# X 2'; w/ hip add jorge yellow ball X 2'; w/ hip abd jorge gray TB X 2'  -quadruped X 2'   Education   Pt to check w/  PCP re: any prebiotic/ probiotics, any supplements such as turmeric Pt to reach out directly to physician re: any supplements   Therapeutic Exercise Minutes 23 40 16 29   Neuro Re-Educ Minutes 15  24 10   Other Timed Activities Minutes   3    Total Time Of Timed Procedures 38 40 43 39   Total Time Of Service-Based Procedures 0 0 0 0   Total Treatment Time 38 40 43 39   HEP Access Code: [email or web address]  URL: [email or web address]  Date: 07/07/2025  Prepared by: Angela Astudillo    Exercises  - Hooklying Clamshell with Resistance  - 1 x daily - 4-5 x weekly - 1-2 sets - 15 reps - 5 sec hold  - Supine Hip Adduction Isometric with Ball  - 1 x daily - 4-5 x weekly - 1-2 sets - 15 reps - 5 sec hold  - Supine Bridge  - 1 x daily - 4-5 x weekly - 1-2 sets - 15 reps - 5 sec hold  - Supine Lower Trunk Rotation  - 1 x daily - 6-7 x weekly - 1 sets - 10 reps - 5 sec hold  - Standing 'L' Stretch at Counter  - 1 x daily - 6-7 x weekly - 3 sets - 30 sec hold  - Kneeling Adductor Stretch with Hip External Rotation  - 1 x daily - 6-7 x weekly - 2-3 sets - 30 sec hold  - Hooklying Hamstring Stretch  - 1 x daily - 6-7 x weekly - 1-2 sets - 5 reps - 10 sec hold  - Supine Butterfly Groin Stretch  - 1 x daily - 7 x weekly - 3 sets - 30 sec hold  - Supine Pelvic Floor Stretch  - 1 x daily - 7 x weekly - 3 sets - 30 sec hold  - Supine Diaphragmatic Breathing with Pelvic Floor Lengthening  - 1 x daily - 7 x weekly - 1 sets - 10 reps - 5 sec hold  - Supine Pelvic Floor Contraction  - 1 x daily - 7 x weekly - 1 sets - 10 reps - 5 sec hold  - Child's Pose Knees Apart and Hands Forward   - 1 x daily - 7 x weekly - 3 sets - 30 sec hold  - Cat Cow  - 1 x daily - 7 x weekly - 2 sets - 10 reps - 5 sec hold  - Diaphragmatic Breathing with Hips Elevated (for Pelvic Organ Prolapse)  - 1 x daily - 7 x weekly - 3 min duration  - Pelvic Floor Muscle Contraction and Pelvic Tilt With Hips Elevated (for Pelvic Organ Prolapse)  - 1 x daily - 5 x weekly  -  3 min duration  - Pelvic Floor Muscle Contraction and Pelvic Tilt to Bridge With Hips Elevated (for Pelvic Organ Prolapse)  - 1 x daily - 5 x weekly - 3 min duration  - Sidelying Reverse Clamshell  - 1 x daily - 5 x weekly - 1-2 sets - 15 reps  - Clamshell  - 1 x daily - 5 x weekly - 1-2 sets - 15 reps  Pelvic brace, pelvic brace w/ ADLs    Access Code: [email or web address]  URL: [email or web address]  Date: 07/21/2025  Prepared by: Angela Astudillo    Exercises  - Hooklying Clamshell with Resistance  - 1 x daily - 4-5 x weekly - 1-2 sets - 15 reps - 5 sec hold  - Supine Hip Adduction Isometric with Ball  - 1 x daily - 4-5 x weekly - 1-2 sets - 15 reps - 5 sec hold  - Supine Bridge  - 1 x daily - 4-5 x weekly - 1-2 sets - 15 reps - 5 sec hold  - Supine Lower Trunk Rotation  - 1 x daily - 6-7 x weekly - 1 sets - 10 reps - 5 sec hold  - Standing 'L' Stretch at Counter  - 1 x daily - 6-7 x weekly - 3 sets - 30 sec hold  - Kneeling Adductor Stretch with Hip External Rotation  - 1 x daily - 6-7 x weekly - 2-3 sets - 30 sec hold  - Hooklying Hamstring Stretch  - 1 x daily - 6-7 x weekly - 1-2 sets - 5 reps - 10 sec hold  - Supine Butterfly Groin Stretch  - 1 x daily - 7 x weekly - 3 sets - 30 sec hold  - Supine Pelvic Floor Stretch  - 1 x daily - 7 x weekly - 3 sets - 30 sec hold  - Supine Diaphragmatic Breathing with Pelvic Floor Lengthening  - 1 x daily - 7 x weekly - 1 sets - 10 reps - 5 sec hold  - Supine Pelvic Floor Contraction  - 1 x daily - 7 x weekly - 1 sets - 10 reps - 5 sec hold  - Child's Pose Knees Apart and Hands Forward   - 1 x daily - 7 x weekly - 3 sets - 30 sec hold  - Cat Cow  - 1 x daily - 7 x weekly - 2 sets - 10 reps - 5 sec hold  - Diaphragmatic Breathing with Hips Elevated (for Pelvic Organ Prolapse)  - 1 x daily - 7 x weekly - 3 min duration  - Pelvic Floor Muscle Contraction and Pelvic Tilt With Hips Elevated (for Pelvic Organ Prolapse)  - 1 x daily - 5 x weekly -  3 min duration  - Pelvic  Floor Muscle Contraction and Pelvic Tilt to Bridge With Hips Elevated (for Pelvic Organ Prolapse)  - 1 x daily - 5 x weekly - 3 min duration  - Sidelying Reverse Clamshell  - 1 x daily - 5 x weekly - 1-2 sets - 15 reps  - Clamshell  - 1 x daily - 5 x weekly - 1-2 sets - 15 reps  - Supine Transversus Abdominis Bracing with Pelvic Floor Contraction  - 1 x daily - 5 x weekly - 2-3 min duration  - Sidelying Hip Abduction  - 1 x daily - 5 x weekly - 1-2 sets - 10 reps  - Seated Pelvic Floor Breathing Coordination  - 1 x daily - 5 x weekly - 1 sets - 10 reps         HEP  Pelvic brace, pelvic brace w/ ADLs    Access Code: [email or web address]  URL: [email or web address]  Date: 07/21/2025  Prepared by: Angela Astudillo    Exercises  - Hooklying Clamshell with Resistance  - 1 x daily - 4-5 x weekly - 1-2 sets - 15 reps - 5 sec hold  - Supine Hip Adduction Isometric with Ball  - 1 x daily - 4-5 x weekly - 1-2 sets - 15 reps - 5 sec hold  - Supine Bridge  - 1 x daily - 4-5 x weekly - 1-2 sets - 15 reps - 5 sec hold  - Supine Lower Trunk Rotation  - 1 x daily - 6-7 x weekly - 1 sets - 10 reps - 5 sec hold  - Standing 'L' Stretch at Counter  - 1 x daily - 6-7 x weekly - 3 sets - 30 sec hold  - Kneeling Adductor Stretch with Hip External Rotation  - 1 x daily - 6-7 x weekly - 2-3 sets - 30 sec hold  - Hooklying Hamstring Stretch  - 1 x daily - 6-7 x weekly - 1-2 sets - 5 reps - 10 sec hold  - Supine Butterfly Groin Stretch  - 1 x daily - 7 x weekly - 3 sets - 30 sec hold  - Supine Pelvic Floor Stretch  - 1 x daily - 7 x weekly - 3 sets - 30 sec hold  - Supine Diaphragmatic Breathing with Pelvic Floor Lengthening  - 1 x daily - 7 x weekly - 1 sets - 10 reps - 5 sec hold  - Supine Pelvic Floor Contraction  - 1 x daily - 7 x weekly - 1 sets - 10 reps - 5 sec hold  - Child's Pose Knees Apart and Hands Forward   - 1 x daily - 7 x weekly - 3 sets - 30 sec hold  - Cat Cow  - 1 x daily - 7 x weekly - 2 sets - 10 reps - 5 sec hold  -  Diaphragmatic Breathing with Hips Elevated (for Pelvic Organ Prolapse)  - 1 x daily - 7 x weekly - 3 min duration  - Pelvic Floor Muscle Contraction and Pelvic Tilt With Hips Elevated (for Pelvic Organ Prolapse)  - 1 x daily - 5 x weekly -  3 min duration  - Pelvic Floor Muscle Contraction and Pelvic Tilt to Bridge With Hips Elevated (for Pelvic Organ Prolapse)  - 1 x daily - 5 x weekly - 3 min duration  - Sidelying Reverse Clamshell  - 1 x daily - 5 x weekly - 1-2 sets - 15 reps  - Clamshell  - 1 x daily - 5 x weekly - 1-2 sets - 15 reps  - Supine Transversus Abdominis Bracing with Pelvic Floor Contraction  - 1 x daily - 5 x weekly - 2-3 min duration  - Sidelying Hip Abduction  - 1 x daily - 5 x weekly - 1-2 sets - 10 reps  - Seated Pelvic Floor Breathing Coordination  - 1 x daily - 5 x weekly - 1 sets - 10 reps    Charges  2TE, 1NMR

## 2025-08-04 ENCOUNTER — APPOINTMENT (OUTPATIENT)
Dept: PHYSICAL THERAPY | Age: 35
End: 2025-08-04
Attending: STUDENT IN AN ORGANIZED HEALTH CARE EDUCATION/TRAINING PROGRAM

## 2025-08-11 ENCOUNTER — OFFICE VISIT (OUTPATIENT)
Dept: PHYSICAL THERAPY | Age: 35
End: 2025-08-11
Attending: STUDENT IN AN ORGANIZED HEALTH CARE EDUCATION/TRAINING PROGRAM

## 2025-08-11 PROCEDURE — 97110 THERAPEUTIC EXERCISES: CPT | Performed by: PHYSICAL THERAPIST

## 2025-08-11 PROCEDURE — 97112 NEUROMUSCULAR REEDUCATION: CPT | Performed by: PHYSICAL THERAPIST

## (undated) NOTE — Clinical Note
Hi - I saw Sarai today with bulge sx & dyspareunia. I've recommended PFPT. I will work to manage her pelvic floor sx. I appreciate the opportunity to participate in her care. Thanks, Estrellita